# Patient Record
Sex: FEMALE | Race: WHITE
[De-identification: names, ages, dates, MRNs, and addresses within clinical notes are randomized per-mention and may not be internally consistent; named-entity substitution may affect disease eponyms.]

---

## 2017-01-12 ENCOUNTER — HOSPITAL ENCOUNTER (EMERGENCY)
Dept: HOSPITAL 10 - E/R | Age: 57
Discharge: HOME | End: 2017-01-12
Payer: COMMERCIAL

## 2017-01-12 VITALS — HEART RATE: 74 BPM | SYSTOLIC BLOOD PRESSURE: 116 MMHG | DIASTOLIC BLOOD PRESSURE: 61 MMHG | RESPIRATION RATE: 18 BRPM

## 2017-01-12 VITALS
WEIGHT: 165.35 LBS | BODY MASS INDEX: 25.95 KG/M2 | HEIGHT: 67 IN | BODY MASS INDEX: 25.95 KG/M2 | WEIGHT: 165.35 LBS | HEIGHT: 67 IN

## 2017-01-12 DIAGNOSIS — R10.31: Primary | ICD-10-CM

## 2017-01-12 LAB
ALBUMIN SERPL-MCNC: 4.1 G/DL (ref 3.3–4.9)
ALBUMIN/GLOB SERPL: 1.36 {RATIO}
ALP SERPL-CCNC: 106 IU/L (ref 42–121)
ALT SERPL-CCNC: 29 IU/L (ref 13–69)
ANION GAP SERPL CALC-SCNC: 18 MMOL/L (ref 8–16)
AST SERPL-CCNC: 26 IU/L (ref 15–46)
BASOPHILS # BLD AUTO: 0 10^3/UL (ref 0–0.1)
BASOPHILS NFR BLD: 0.7 % (ref 0–2)
BILIRUB DIRECT SERPL-MCNC: 0 MG/DL (ref 0–0.2)
BILIRUB SERPL-MCNC: 0.2 MG/DL (ref 0.2–1.3)
BUN SERPL-MCNC: 21 MG/DL (ref 7–20)
CALCIUM SERPL-MCNC: 9.3 MG/DL (ref 8.4–10.2)
CHLORIDE SERPL-SCNC: 111 MMOL/L (ref 97–110)
CO2 SERPL-SCNC: 23 MMOL/L (ref 21–31)
CONDITION: 1
CREAT SERPL-MCNC: 1.09 MG/DL (ref 0.44–1)
EOSINOPHIL # BLD: 0.2 10^3/UL (ref 0–0.5)
EOSINOPHIL NFR BLD: 3 % (ref 0–7)
ERYTHROCYTE [DISTWIDTH] IN BLOOD BY AUTOMATED COUNT: 12.8 % (ref 11.5–14.5)
GLOBULIN SER-MCNC: 3 G/DL (ref 1.3–3.2)
GLUCOSE SERPL-MCNC: 111 MG/DL (ref 70–220)
HCT VFR BLD CALC: 42.4 % (ref 37–47)
HGB BLD-MCNC: 14.7 G/DL (ref 12–16)
LYMPHOCYTES # BLD AUTO: 1.9 10^3/UL (ref 0.8–2.9)
LYMPHOCYTES NFR BLD AUTO: 37.4 % (ref 15–51)
MCH RBC QN AUTO: 31.3 PG (ref 29–33)
MCHC RBC AUTO-ENTMCNC: 34.8 G/DL (ref 32–37)
MCV RBC AUTO: 90.1 FL (ref 82–101)
MONOCYTES # BLD: 0.4 10^3/UL (ref 0.3–0.9)
MONOCYTES NFR BLD: 7.3 % (ref 0–11)
NEUTROPHILS # BLD: 2.6 10^3/UL (ref 1.6–7.5)
NEUTROPHILS NFR BLD AUTO: 51.6 % (ref 39–77)
NRBC # BLD MANUAL: 0 10^3/UL (ref 0–0)
NRBC BLD QL: 0 /100WBC (ref 0–0)
PLATELET # BLD: 174 10^3/UL (ref 140–440)
PMV BLD AUTO: 10.6 FL (ref 7.4–10.4)
POTASSIUM SERPL-SCNC: 4.2 MMOL/L (ref 3.5–5.1)
PROT SERPL-MCNC: 7.1 G/DL (ref 6.1–8.1)
RBC # BLD AUTO: 4.71 10^6/UL (ref 4.2–5.4)
SODIUM SERPL-SCNC: 148 MMOL/L (ref 135–144)
URINE BLOOD (DIP) POC: (no result)
WBC # BLD AUTO: 5.1 10^3/UL (ref 4.8–10.8)
WBC # BLD: 5.1 10^3/UL (ref 4.8–10.8)

## 2017-01-12 PROCEDURE — 83690 ASSAY OF LIPASE: CPT

## 2017-01-12 PROCEDURE — 96374 THER/PROPH/DIAG INJ IV PUSH: CPT

## 2017-01-12 PROCEDURE — 85025 COMPLETE CBC W/AUTO DIFF WBC: CPT

## 2017-01-12 PROCEDURE — 36415 COLL VENOUS BLD VENIPUNCTURE: CPT

## 2017-01-12 PROCEDURE — 96375 TX/PRO/DX INJ NEW DRUG ADDON: CPT

## 2017-01-12 PROCEDURE — 80053 COMPREHEN METABOLIC PANEL: CPT

## 2017-01-12 PROCEDURE — 81003 URINALYSIS AUTO W/O SCOPE: CPT

## 2017-01-12 PROCEDURE — 74176 CT ABD & PELVIS W/O CONTRAST: CPT

## 2017-01-12 NOTE — RADRPT
PROCEDURE:    CT ABDOMEN/PELVIS WITHOUT CONTRAST  

 

CLINICAL INDICATION:   56-year-old female with abdominal pain. 

 

TECHNIQUE:   The study was performed utilizing a GE LightSpeed VCT 64-slice CT scanner.  Direct axia
l sections were obtained through the abdomen and pelvis without the use of intravenous contrast mate
rial. Sagittal and coronal reformations were obtained. The images were reviewed on a PACS workstatio
n.   CTD/vol = 12.9 mGy; Total Exam DLP = 730 mGy-cm. 

 

COMPARISON:    CT abdomen/pelvis September 30, 2015. 

 

FINDINGS:

 

There is mild scarring within the lingula.  There is no evidence for significant pleural effusion.  
The liver has a normal size and contour without focal areas of abnormal density. No intrahepatic nor
 extrahepatic biliary ductal dilatation is seen. The gallbladder demonstrates no wall thickening nor
 pericholecystic fluid. No biliary stones are evident. The pancreas is without areas of abnormal att
enuation.  This spleen is identified and has a normal size without abnormal density. The adrenal gla
nds are unremarkable. There is mild right-sided hydroureteronephrosis with an obstructing calculus i
n the distal right ureter measuring approximately 2 x 2 mm.  There are multiple nonobstructing calcu
li scattered throughout the left kidney with the largest in the left lower pole measuring approximat
ely 5 x 6 x 5 mm. The urinary bladder contains a small volume of urine. There is moderate retained s
tool identified within the proximal colon without obstruction.  Multiple diverticula seen within 

the descending and sigmoid colon without surrounding inflammatory changes. The appendix is visualize
d and is without abnormal thickening or surrounding inflammatory reaction. The uterus is not visuali
zed consistent with prior hysterectomy.  There is no significant free fluid. The aortoiliac vessels 
are without aneurysmal dilatation. The osseous structures are intact. 

 

IMPRESSION:

 

1.  Mild right-sided hydroureteronephrosis with obstructing distal right ureteral 2 x 2 mm calculus.
   

2.  Multiple nonobstructing left renal calculi.

3.  Retained stool within the ascending and transverse colon without obstruction.

4.  Descending and sigmoid colon diverticulosis.

5.  No CT evidence for appendicitis.

6.  Status post hysterectomy.

_____________________________________________ 

.Masood Duarte MD, MD           Date    Time 

Electronically viewed and signed by .Masood Duarte MD, MD on 01/12/2017 05:58 

 

D:  01/12/2017 05:58  T:  01/12/2017 05:58

.SHI

## 2017-01-12 NOTE — ERA
ER Documentation


Chief Complaint


Date/Time


DATE: 17 


TIME: 04:50


Chief Complaint


RLQ abd pain radaiting to back since 1 hour ago





HPI


The patient is a 56-year-old female, presenting to the ER because of sudden 

onset of right sided abdominal pain about 3 AM.  She has similar symptoms 

previously from kidney stone.  The pain is 10/10, no aggravating or relieving 

factor.  She does not have fever, chills, neck pain, chest pain, vomiting, 

diarrhea, constipation, dysuria. She does not smoke nor drink





Past medical history: Migraine, anxiety, history of kidney stone


Past surgical history: 4 , hysterectomy





ROS


All systems reviewed and are negative except as per history of present illness.





Medications


Home Meds


Active Scripts


Hydrocodone/Acetaminophen (Norco 5-325 Tablet) 1 Each Tablet, 1 TAB PO Q6H Y 

for PAIN, #7 TAB


   Prov:MK PEDRO MD         17


Hydrocodone Bit-Acetaminophen* (Norco*) 5-325 Mg Tab, 1 TAB PO Q6 Y for PAIN, #

7 TAB


   Prov:MK PEDRO MD         9/30/15


Reported Medications


Zolpidem Tartrate* (Ambien*) 10 Mg Tablet, 10 MG PO HS Y for INSOMNIA, TAB


   9/30/15


Topiramate* (Topamax*) 100 Mg Tablet, 100 MG PO DAILY, TAB


   9/30/15


Sumatriptan Succinate* (Imitrex*) 25 Mg Tablet, 25 MG PO BID Y for MILD PAIN 

LEVEL 1-3, TAB


   May repeat after 2 hours if needed;  mg/24 hours


   14


Lorazepam* (Ativan*) 2 Mg Tablet, 2 MG PO HS


   10/2/13


Desloratadine (Clarinex) 5 Mg/Tab Tab.rapdis, 5 MG PO DAILY


   10/2/13





Allergies


Allergies:  


Coded Allergies:  


     latex (Verified  Allergy, Unknown, 9/30/15)


     levofloxacin (Verified  Allergy, Unknown, 9/30/15)





PMhx/Soc


History of Surgery:  Yes (2 CARPAL TUNNEL;RHINOPLASTY, 3 c-sections, 

hysterectomy, oofrectomy)


Anesthesia Reaction:  No


Hx Neurological Disorder:  Yes (MIGRAINES)


Hx Respiratory Disorders:  No


Hx Cardiac Disorders:  No


Hx Psychiatric Problems:  Yes (ANXIETY)


Hx Miscellaneous Medical Probl:  Yes (pain contract for work related injury)


Hx Alcohol Use:  No


Hx Substance Use:  No


Hx Tobacco Use:  No





Physical Exam


Vitals





Vital Signs








  Date Time  Temp Pulse Resp B/P Pulse Ox O2 Delivery O2 Flow Rate FiO2


 


17 05:43  74 18 116/61 99 Room Air  


 


17 04:32 98.3 76 20 172/81 98   








Physical Exam


 Const:      No acute distress.


 Head:        Atraumatic.


 Eyes:       Normal Conjunctiva.


 ENT:         Normal External Ears, Nose and Mouth.


 Neck:        Full range of motion.  No meningismus.


 Resp:         Clear to auscultation bilaterally.


 Cardio:       Regular rate and rhythm, no murmurs.


 Abd:         Soft,  non distended, normal bowel sounds, mild right flank 

tenderness and right lower quadrant tenderness.  No rigidity, rebound, CVA 

tenderness


 Skin:         No petechiae or rashes.


 Back:        No midline or flank tenderness.


 Ext:          No cyanosis, or edema.


 Neur:        Awake and alert. No focal deficit


 Psych:        Normal Mood and Affect.


Result Diagram:  


17 0505                                                                   

             17 0505





Results 24 hrs








 Laboratory Tests








Test


  17


05:05 17


05:50


 


Alanine Aminotransferase


(ALT/SGPT) 29IU/L 


  


 


 


Albumin 4.1g/dl  


 


Albumin/Globulin Ratio 1.36  


 


Alkaline Phosphatase 106IU/L  


 


Anion Gap 18  


 


Aspartate Amino Transf


(AST/SGOT) 26IU/L 


  


 


 


Basophils # 0.010^3/ul  


 


Basophils % 0.7%  


 


Blood Morphology Comment   


 


Blood Urea Nitrogen 21mg/dl  


 


Calcium Level 9.3mg/dl  


 


Carbon Dioxide Level 23mmol/L  


 


Chloride Level 111mmol/L  


 


Creatinine 1.09mg/dl  


 


Direct Bilirubin 0.00mg/dl  


 


Eosinophils # 0.210^3/ul  


 


Eosinophils % 3.0%  


 


Globulin 3.00g/dl  


 


Glucose Level 111mg/dl  


 


Hematocrit 42.4%  


 


Hemoglobin 14.7g/dl  


 


Indirect Bilirubin 0.2mg/dl  


 


Lipase 68U/L  


 


Lymphocytes # 1.910^3/ul  


 


Lymphocytes % 37.4%  


 


Mean Corpuscular Hemoglobin 31.3pg  


 


Mean Corpuscular Hemoglobin


Concent 34.8g/dl 


  


 


 


Mean Corpuscular Volume 90.1fl  


 


Mean Platelet Volume 10.6fl  


 


Monocytes # 0.410^3/ul  


 


Monocytes % 7.3%  


 


Neutrophils # 2.610^3/ul  


 


Neutrophils % 51.6%  


 


Nucleated Red Blood Cells # 0.010^3/ul  


 


Nucleated Red Blood Cells % 0.0/100WBC  


 


Platelet Count 34247^3/UL  


 


Potassium Level 4.2mmol/L  


 


Red Blood Count 4.7110^6/ul  


 


Red Cell Distribution Width 12.8%  


 


Sodium Level 148mmol/L  


 


Total Bilirubin 0.2mg/dl  


 


Total Protein 7.1g/dl  


 


White Blood Count 5.110^3/ul  


 


Bedside Urine Blood  3+ 


 


Bedside Urine Glucose (UA)  Negative 


 


Bedside Urine Ketones (LAB)  Negative 


 


Bedside Urine Leukocyte


Esterase (L 


  Trace 


 


 


Bedside Urine Nitrite (LAB)  Negative 


 


Bedside Urine Protein (LAB)  Negative 


 


Bedside Urine pH (LAB)  5.5 








 Current Medications








 Medications


  (Trade)  Dose


 Ordered  Sig/Bri


 Route


 PRN Reason  Start Time


 Stop Time Status Last Admin


Dose Admin


 


 Morphine Sulfate


  (morphine)  4 mg  ONCE  STAT


 IV


   17 04:58


 17 05:00 DC 17 05:10


 


 


 Ondansetron HCl


  (Zofran Inj)  4 mg  ONCE  STAT


 IV


   17 04:58


 17 05:00 DC 17 05:10


 


 


 Ketorolac


 Tromethamine


  (Toradol)  30 mg  ONCE  ONCE


 IV


   17 06:07


 17 06:08 DC 17 06:19


 














Procedures/[place] [id number]


 Radiology Main Line: [phone]





 DIAGNOSTIC IMAGING REPORT





 Patient: DUANE ROSA   : 1960   Age: 56  Sex: F                  

      


 MR #:    [id number]   [facility]   [id number]    DOS: 17 0458


 Ordering MD: MK PEDRO MD   Location:  E/R   Room/Bed:                  

                          


 








PROCEDURE:    CT ABDOMEN/PELVIS WITHOUT CONTRAST  


 


CLINICAL INDICATION:   56-year-old female with abdominal pain. 


 


TECHNIQUE:   The study was performed utilizing a GE LightSpeInVivioLink VCT 64-slice CT 

scanner.  Direct axial sections were obtained through the abdomen and pelvis 

without the use of intravenous contrast material. Sagittal and coronal 

reformations were obtained. The images were reviewed on a PACS workstation.   

CTD/vol = 12.9 mGy; Total Exam DLP = 730 mGy-cm. 


 


COMPARISON:    CT abdomen/pelvis 2015. 


 


FINDINGS:


 


There is mild scarring within the lingula.  There is no evidence for 

significant pleural effusion.  The liver has a normal size and contour without 

focal areas of abnormal density. No intrahepatic nor extrahepatic biliary 

ductal dilatation is seen. The gallbladder demonstrates no wall thickening nor 

pericholecystic fluid. No biliary stones are evident. The pancreas is without 

areas of abnormal attenuation.  This spleen is identified and has a normal size 

without abnormal density. The adrenal glands are unremarkable. There is mild 

right-sided hydroureteronephrosis with an obstructing calculus in the distal 

right ureter measuring approximately 2 x 2 mm.  There are multiple 

nonobstructing calculi scattered throughout the left kidney with the largest in 

the left lower pole measuring approximately 5 x 6 x 5 mm. The urinary bladder 

contains a small volume of urine. There is moderate retained stool identified 

within the proximal colon without obstruction.  Multiple diverticula seen 

within 


the descending and sigmoid colon without surrounding inflammatory changes. The 

appendix is visualized and is without abnormal thickening or surrounding 

inflammatory reaction. The uterus is not visualized consistent with prior 

hysterectomy.  There is no significant free fluid. The aortoiliac vessels are 

without aneurysmal dilatation. The osseous structures are intact. 


 


IMPRESSION:


 


1.  Mild right-sided hydroureteronephrosis with obstructing distal right 

ureteral 2 x 2 mm calculus.   


2.  Multiple nonobstructing left renal calculi.


3.  Retained stool within the ascending and transverse colon without 

obstruction.


4.  Descending and sigmoid colon diverticulosis.


5.  No CT evidence for appendicitis.


6.  Status post hysterectomy.


_____________________________________________ 


.Masood Duarte MD, MD           Date    Time 


Electronically viewed and signed by .Masood Duarte MD, MD on 2017 05:58 


 


D:  2017 05:58  T:  2017 05:58


.M/





CC: MK PEDRO MD





MEDICAL MAKING DECISION: The patient is a 56-year-old female, presenting with 

acute right ureterolithiasis.  She was treated with morphine 4 mg IV and 

Toradol 30 mg IV for pain and Zofran 4 mg IV for nausea with good response.  

The differential diagnoses considered include but are not limited to 

cholelithiasis, cholecystitis, cystitis, pancreatitis, hepatitis, gastritis, 

peptic ulcer disease, gastric ulcer, appendicitis, diverticulitis, cholangitis, 

choledocholithiasis, partial small bowel obstruction.





Departure


Diagnosis:  


 Primary Impression:  


 Ureterolithiasis


Condition:  Good


Comments


She was discharged with Norco


I discussed the findings with the patient. I advised the patient to follow-up 

with the primary physician in about 1-2 days, sooner if needed and return if 

any concern.





The patient's blood pressure was elevated (>120/80) but appears stable without 

evidence of hypertension emergency or urgency.  The patient was counseled about 

the risks of hypertension and urged to pursue outpatient monitoring and therapy 

within a week with their primary care physician.











MK PEDRO MD 2017 04:51

## 2017-01-17 ENCOUNTER — HOSPITAL ENCOUNTER (EMERGENCY)
Dept: HOSPITAL 10 - FTE | Age: 57
LOS: 1 days | Discharge: HOME | End: 2017-01-18
Payer: COMMERCIAL

## 2017-01-17 VITALS
BODY MASS INDEX: 26.4 KG/M2 | HEIGHT: 66 IN | WEIGHT: 164.24 LBS | WEIGHT: 164.24 LBS | BODY MASS INDEX: 26.4 KG/M2 | HEIGHT: 66 IN

## 2017-01-17 DIAGNOSIS — M25.561: Primary | ICD-10-CM

## 2017-01-17 DIAGNOSIS — Z91.040: ICD-10-CM

## 2017-01-17 PROCEDURE — 29505 APPLICATION LONG LEG SPLINT: CPT

## 2017-01-17 PROCEDURE — 73562 X-RAY EXAM OF KNEE 3: CPT

## 2017-01-17 NOTE — RADRPT
PROCEDURE:  knee x-ray 

 

CLINICAL INDICATION:  right knee pain

 

TECHNIQUE:   AP, lateral and oblique views of the right knee were obtained. 

 

COMPARISON:   None 

 

FINDINGS:

There is normal mineralization.  

No acute fracture or dislocation is seen.  

Minimal lateral compartment joint spurring.

There is no joint effusion. 

There is no significant soft tissue swelling. 

 

IMPRESSION:

No fracture or dislocation.  No soft tissue abnormality.

 

RPTAT:AAJJ

_____________________________________________ 

RUSTY Garcia Physician           Date    Time 

Electronically viewed and signed by Physician Rolf on 01/17/2017 23:55 

 

D:  01/17/2017 23:55  T:  01/17/2017 23:55

MIRELLA/

## 2017-01-17 NOTE — ERD
ER Documentation


Chief Complaint


Date/Time


DATE: 1/17/17 


TIME: 23:51


Chief Complaint


RT KNEE PAIN X 2 DAYS; LAST NORCO TAKEN YESTERDAY.





HPI


This is a 56-year-old female presenting to the emergency department complaining 

of right knee pain since Sunday.  Patient states that she bent down and heard a 

pop that started the pain.  She locates the pain in the anterior region of the 

right knee.  She rates the pain 7 out of 10.  Patient states that she has 

difficulty walking.





ROS


All systems reviewed and are negative except as per history of present illness.





Medications


Home Meds


Active Scripts


Naproxen* (Naproxen*) 500 Mg Tablet, 500 MG PO BID Y for PAIN, #30 TAB


   Prov:LEONARDA NYE PA-C         1/17/17


Hydrocodone/Acetaminophen (Norco 5-325 Tablet) 1 Each Tablet, 1 TAB PO Q6H Y 

for PAIN, #7 TAB


   Prov:MK PEDRO MD         1/12/17


Hydrocodone Bit-Acetaminophen* (Norco*) 5-325 Mg Tab, 1 TAB PO Q6 Y for PAIN, #

7 TAB


   Prov:MK PEDRO MD         9/30/15


Reported Medications


Zolpidem Tartrate* (Ambien*) 10 Mg Tablet, 10 MG PO HS Y for INSOMNIA, TAB


   9/30/15


Topiramate* (Topamax*) 100 Mg Tablet, 100 MG PO DAILY, TAB


   9/30/15


Sumatriptan Succinate* (Imitrex*) 25 Mg Tablet, 25 MG PO BID Y for MILD PAIN 

LEVEL 1-3, TAB


   May repeat after 2 hours if needed;  mg/24 hours


   6/27/14


Lorazepam* (Ativan*) 2 Mg Tablet, 2 MG PO HS


   10/2/13


Desloratadine (Clarinex) 5 Mg/Tab Tab.rapdis, 5 MG PO DAILY


   10/2/13





Allergies


Allergies:  


Coded Allergies:  


     latex (Verified  Allergy, Unknown, 9/30/15)


     levofloxacin (Verified  Allergy, Unknown, 9/30/15)





PMhx/Soc


History of Surgery:  Yes (2 CARPAL TUNNEL;RHINOPLASTY, 3 c-sections, 

hysterectomy, oofrectomy)


Anesthesia Reaction:  No


Hx Neurological Disorder:  Yes (MIGRAINES)


Hx Respiratory Disorders:  No


Hx Cardiac Disorders:  No


Hx Psychiatric Problems:  Yes (ANXIETY)


Hx Miscellaneous Medical Probl:  Yes (pain contract for work related injury, 

kidney stone)


Hx Alcohol Use:  No


Hx Substance Use:  No


Hx Tobacco Use:  No


Smoking Status:  Never smoker





Physical Exam


Vitals





Vital Signs








  Date Time  Temp Pulse Resp B/P Pulse Ox O2 Delivery O2 Flow Rate FiO2


 


1/17/17 20:12 98.4 86 18 157/80 99   








Physical Exam


General: WD/WN, in no apparent distress, non-toxic appearing


HENT: NC/AT


Eyes: Conjunctiva normal


Neck: Supple


Pulm: Clear to auscultation, normal labored breathing; no wheezing/rales/

rhonchi heard


CV: Good capillary refill


GI: Non-distended, no guarding


Back: No masses


Ext: Tender palpation over the right anterior knee, full range of motion, 

patient was ambulating well around the ER however in the exam she has had 

difficulty walking.


Neuro: Moves on all fours


Skin: intact


Psych: Normal mood





Procedures/MDM


This is a 56-year-old female presenting to the emergency department complaining 

of right knee pain since Sunday.  I will low suspicion for fracture 

dislocation.  I discussed with patient that she is suitable to follow-up with 

her primary care physician to get a referral to see an orthopedist to consider 

an MRI.  Patient examination room had tenderness in the right anterior knee and 

had difficulty walking in exam room however she was walking really well 

throughout the ER.  An x-ray of the right knee was done and did not show any 

evidence of fracture dislocation.  Patient will be given a prescription for 

naproxen.  With precautions to return to the ER for any worsening symptoms..  A 

knee immobilizer was applied, she is neurovascular intact she understands and 

agrees this plan





Departure


Diagnosis:  


 Primary Impression:  


 Knee pain


 Laterality:  right  Chronicity:  acute  Qualified Code:  M25.561 - Acute pain 

of right knee


Condition:  Stable


Patient Instructions:  Knee Pain, Uncertain Cause, Knee Sprain


Referrals:  


TUNDE ORTIZ MD (PCP)





Additional Instructions:  


FOLLOW UP WITH YOUR PRIMARY CARE PHYSICIAN TOMORROW.Return to this facility if 

you are not improving as expected.








Take all medicines as directed.








Return to this facility if you are not improving as expected.











LEONARDA NYE PA-C Jan 17, 2017 23:54

## 2017-01-18 VITALS
HEART RATE: 68 BPM | SYSTOLIC BLOOD PRESSURE: 142 MMHG | TEMPERATURE: 98.1 F | DIASTOLIC BLOOD PRESSURE: 74 MMHG | RESPIRATION RATE: 18 BRPM

## 2018-03-23 ENCOUNTER — HOSPITAL ENCOUNTER (EMERGENCY)
Age: 58
Discharge: HOME | End: 2018-03-23

## 2018-03-23 ENCOUNTER — HOSPITAL ENCOUNTER (EMERGENCY)
Dept: HOSPITAL 91 - E/R | Age: 58
Discharge: HOME | End: 2018-03-23
Payer: COMMERCIAL

## 2018-03-23 DIAGNOSIS — N20.0: Primary | ICD-10-CM

## 2018-03-23 LAB
ADD UMIC: YES
UR ASCORBIC ACID: NEGATIVE MG/DL
UR BILIRUBIN (DIP): NEGATIVE MG/DL
UR BLOOD (DIP): (no result) MG/DL
UR CLARITY: (no result)
UR COLOR: (no result)
UR GLUCOSE (DIP): NEGATIVE MG/DL
UR KETONES (DIP): NEGATIVE MG/DL
UR LEUKOCYTE ESTERASE (DIP): (no result) LEU/UL
UR NITRITE (DIP): NEGATIVE MG/DL
UR PH (DIP): 6 (ref 5–9)
UR RBC: > 182 /HPF (ref 0–5)
UR SPECIFIC GRAVITY (DIP): 1.02 (ref 1–1.03)
UR TOTAL PROTEIN (DIP): (no result) MG/DL
UR UROBILINOGEN (DIP): NEGATIVE MG/DL
UR WBC: 0 /HPF (ref 0–5)

## 2018-03-23 PROCEDURE — 81001 URINALYSIS AUTO W/SCOPE: CPT

## 2018-03-23 PROCEDURE — 99284 EMERGENCY DEPT VISIT MOD MDM: CPT

## 2018-03-23 PROCEDURE — 96372 THER/PROPH/DIAG INJ SC/IM: CPT

## 2018-03-23 RX ADMIN — KETOROLAC TROMETHAMINE 1 MG: 30 INJECTION, SOLUTION INTRAMUSCULAR at 16:30

## 2018-04-15 ENCOUNTER — HOSPITAL ENCOUNTER (INPATIENT)
Dept: HOSPITAL 91 - E/R | Age: 58
LOS: 1 days | Discharge: HOME | DRG: 670 | End: 2018-04-16
Payer: COMMERCIAL

## 2018-04-15 ENCOUNTER — HOSPITAL ENCOUNTER (INPATIENT)
Age: 58
LOS: 1 days | Discharge: HOME | DRG: 670 | End: 2018-04-16

## 2018-04-15 DIAGNOSIS — R11.2: ICD-10-CM

## 2018-04-15 DIAGNOSIS — N20.1: Primary | ICD-10-CM

## 2018-04-15 DIAGNOSIS — G43.909: ICD-10-CM

## 2018-04-15 LAB
ADD MAN DIFF?: NO
ADD UMIC: NO
ALANINE AMINOTRANSFERASE: 27 IU/L (ref 13–69)
ALBUMIN/GLOBULIN RATIO: 1.58
ALBUMIN: 3.8 G/DL (ref 3.3–4.9)
ALKALINE PHOSPHATASE: 89 IU/L (ref 42–121)
ANION GAP: 17 (ref 8–16)
ASPARTATE AMINO TRANSFERASE: 16 IU/L (ref 15–46)
BASOPHIL #: 0 10^3/UL (ref 0–0.1)
BASOPHILS %: 0.5 % (ref 0–2)
BILIRUBIN,DIRECT: 0 MG/DL (ref 0–0.2)
BILIRUBIN,TOTAL: 0.1 MG/DL (ref 0.2–1.3)
BLOOD UREA NITROGEN: 24 MG/DL (ref 7–20)
CALCIUM: 9.4 MG/DL (ref 8.4–10.2)
CARBON DIOXIDE: 24 MMOL/L (ref 21–31)
CHLORIDE: 109 MMOL/L (ref 97–110)
CREATININE: 1 MG/DL (ref 0.44–1)
EOSINOPHILS #: 0.1 10^3/UL (ref 0–0.5)
EOSINOPHILS %: 1.7 % (ref 0–7)
GLOBULIN: 2.4 G/DL (ref 1.3–3.2)
GLUCOSE: 116 MG/DL (ref 70–220)
HEMATOCRIT: 40 % (ref 37–47)
HEMOGLOBIN: 13.6 G/DL (ref 12–16)
INR: 0.87
LIPASE: 50 U/L (ref 23–300)
LYMPHOCYTES #: 1.2 10^3/UL (ref 0.8–2.9)
LYMPHOCYTES %: 15.5 % (ref 15–51)
MEAN CORPUSCULAR HEMOGLOBIN: 30.8 PG (ref 29–33)
MEAN CORPUSCULAR HGB CONC: 34 G/DL (ref 32–37)
MEAN CORPUSCULAR VOLUME: 90.5 FL (ref 82–101)
MEAN PLATELET VOLUME: 12.1 FL (ref 7.4–10.4)
MONOCYTE #: 0.6 10^3/UL (ref 0.3–0.9)
MONOCYTES %: 7.9 % (ref 0–11)
NEUTROPHIL #: 5.6 10^3/UL (ref 1.6–7.5)
NEUTROPHILS %: 74.1 % (ref 39–77)
NUCLEATED RED BLOOD CELLS #: 0 10^3/UL (ref 0–0)
NUCLEATED RED BLOOD CELLS%: 0 /100WBC (ref 0–0)
PARTIAL THROMBOPLASTIN TIME: 26.4 SEC (ref 25–35)
PLATELET COUNT: 185 10^3/UL (ref 140–415)
POSITIVE DIFF: (no result)
POTASSIUM: 3.8 MMOL/L (ref 3.5–5.1)
PROTIME: 11.9 SEC (ref 11.9–14.9)
PT RATIO: 0.9
RED BLOOD COUNT: 4.42 10^6/UL (ref 4.2–5.4)
RED CELL DISTRIBUTION WIDTH: 11.8 % (ref 11.5–14.5)
SODIUM: 146 MMOL/L (ref 135–144)
TOTAL PROTEIN: 6.2 G/DL (ref 6.1–8.1)
UR ASCORBIC ACID: NEGATIVE MG/DL
UR BILIRUBIN (DIP): NEGATIVE MG/DL
UR BLOOD (DIP): NEGATIVE MG/DL
UR CLARITY: CLEAR
UR COLOR: (no result)
UR GLUCOSE (DIP): NEGATIVE MG/DL
UR KETONES (DIP): NEGATIVE MG/DL
UR LEUKOCYTE ESTERASE (DIP): NEGATIVE LEU/UL
UR NITRITE (DIP): NEGATIVE MG/DL
UR PH (DIP): 7 (ref 5–9)
UR SPECIFIC GRAVITY (DIP): 1.01 (ref 1–1.03)
UR TOTAL PROTEIN (DIP): NEGATIVE MG/DL
UR UROBILINOGEN (DIP): NEGATIVE MG/DL
WHITE BLOOD COUNT: 7.6 10^3/UL (ref 4.8–10.8)

## 2018-04-15 PROCEDURE — 74018 RADEX ABDOMEN 1 VIEW: CPT

## 2018-04-15 PROCEDURE — 80053 COMPREHEN METABOLIC PANEL: CPT

## 2018-04-15 PROCEDURE — 74430 CONTRAST X-RAY BLADDER: CPT

## 2018-04-15 PROCEDURE — 99285 EMERGENCY DEPT VISIT HI MDM: CPT

## 2018-04-15 PROCEDURE — 85730 THROMBOPLASTIN TIME PARTIAL: CPT

## 2018-04-15 PROCEDURE — 81001 URINALYSIS AUTO W/SCOPE: CPT

## 2018-04-15 PROCEDURE — 93005 ELECTROCARDIOGRAM TRACING: CPT

## 2018-04-15 PROCEDURE — 86901 BLOOD TYPING SEROLOGIC RH(D): CPT

## 2018-04-15 PROCEDURE — 85025 COMPLETE CBC W/AUTO DIFF WBC: CPT

## 2018-04-15 PROCEDURE — 81003 URINALYSIS AUTO W/O SCOPE: CPT

## 2018-04-15 PROCEDURE — 96375 TX/PRO/DX INJ NEW DRUG ADDON: CPT

## 2018-04-15 PROCEDURE — 96374 THER/PROPH/DIAG INJ IV PUSH: CPT

## 2018-04-15 PROCEDURE — 71045 X-RAY EXAM CHEST 1 VIEW: CPT

## 2018-04-15 PROCEDURE — 86850 RBC ANTIBODY SCREEN: CPT

## 2018-04-15 PROCEDURE — 36415 COLL VENOUS BLD VENIPUNCTURE: CPT

## 2018-04-15 PROCEDURE — 86900 BLOOD TYPING SEROLOGIC ABO: CPT

## 2018-04-15 PROCEDURE — 83735 ASSAY OF MAGNESIUM: CPT

## 2018-04-15 PROCEDURE — 85610 PROTHROMBIN TIME: CPT

## 2018-04-15 PROCEDURE — 87086 URINE CULTURE/COLONY COUNT: CPT

## 2018-04-15 PROCEDURE — 84100 ASSAY OF PHOSPHORUS: CPT

## 2018-04-15 PROCEDURE — 83690 ASSAY OF LIPASE: CPT

## 2018-04-15 RX ADMIN — THIAMINE HYDROCHLORIDE 1 MLS/HR: 100 INJECTION, SOLUTION INTRAMUSCULAR; INTRAVENOUS at 09:03

## 2018-04-15 RX ADMIN — THIAMINE HYDROCHLORIDE 1 MLS/HR: 100 INJECTION, SOLUTION INTRAMUSCULAR; INTRAVENOUS at 15:29

## 2018-04-15 RX ADMIN — KETOROLAC TROMETHAMINE 1 MG: 15 INJECTION, SOLUTION INTRAMUSCULAR; INTRAVENOUS at 05:41

## 2018-04-15 RX ADMIN — MORPHINE SULFATE 1 MG: 2 INJECTION, SOLUTION INTRAMUSCULAR; INTRAVENOUS at 23:30

## 2018-04-15 RX ADMIN — MORPHINE SULFATE 1 MG: 2 INJECTION, SOLUTION INTRAMUSCULAR; INTRAVENOUS at 17:29

## 2018-04-15 RX ADMIN — THIAMINE HYDROCHLORIDE 1 MLS/HR: 100 INJECTION, SOLUTION INTRAMUSCULAR; INTRAVENOUS at 06:56

## 2018-04-15 RX ADMIN — THIAMINE HYDROCHLORIDE 1 MLS/HR: 100 INJECTION, SOLUTION INTRAMUSCULAR; INTRAVENOUS at 17:28

## 2018-04-15 RX ADMIN — ONDANSETRON HYDROCHLORIDE 1 MG: 2 INJECTION, SOLUTION INTRAMUSCULAR; INTRAVENOUS at 05:41

## 2018-04-15 RX ADMIN — MORPHINE SULFATE 1 MG: 2 INJECTION, SOLUTION INTRAMUSCULAR; INTRAVENOUS at 09:08

## 2018-04-15 RX ADMIN — SUMATRIPTAN SUCCINATE 1 MG: 25 TABLET ORAL at 11:44

## 2018-04-15 RX ADMIN — TOPIRAMATE 1 MG: 100 TABLET, FILM COATED ORAL at 11:44

## 2018-04-15 RX ADMIN — TAMSULOSIN HYDROCHLORIDE 1 MG: 0.4 CAPSULE ORAL at 20:16

## 2018-04-16 LAB
ADD MAN DIFF?: NO
ADD UMIC: YES
ALANINE AMINOTRANSFERASE: 20 IU/L (ref 13–69)
ALBUMIN/GLOBULIN RATIO: 1.29
ALBUMIN: 3.1 G/DL (ref 3.3–4.9)
ALKALINE PHOSPHATASE: 87 IU/L (ref 42–121)
ANION GAP: 14 (ref 8–16)
ASPARTATE AMINO TRANSFERASE: 17 IU/L (ref 15–46)
BASOPHIL #: 0.1 10^3/UL (ref 0–0.1)
BASOPHILS %: 0.9 % (ref 0–2)
BILIRUBIN,DIRECT: 0 MG/DL (ref 0–0.2)
BILIRUBIN,TOTAL: 0 MG/DL (ref 0.2–1.3)
BLOOD UREA NITROGEN: 22 MG/DL (ref 7–20)
CALCIUM: 8.8 MG/DL (ref 8.4–10.2)
CARBON DIOXIDE: 22 MMOL/L (ref 21–31)
CHLORIDE: 114 MMOL/L (ref 97–110)
CREATININE: 1.25 MG/DL (ref 0.44–1)
EOSINOPHILS #: 0.2 10^3/UL (ref 0–0.5)
EOSINOPHILS %: 3.9 % (ref 0–7)
GLOBULIN: 2.4 G/DL (ref 1.3–3.2)
GLUCOSE: 122 MG/DL (ref 70–220)
HEMATOCRIT: 36.6 % (ref 37–47)
HEMOGLOBIN: 12.4 G/DL (ref 12–16)
LYMPHOCYTES #: 1.6 10^3/UL (ref 0.8–2.9)
LYMPHOCYTES %: 29.6 % (ref 15–51)
MAGNESIUM: 1.8 MG/DL (ref 1.7–2.5)
MEAN CORPUSCULAR HEMOGLOBIN: 30.8 PG (ref 29–33)
MEAN CORPUSCULAR HGB CONC: 33.9 G/DL (ref 32–37)
MEAN CORPUSCULAR VOLUME: 91 FL (ref 82–101)
MEAN PLATELET VOLUME: 11.9 FL (ref 7.4–10.4)
MONOCYTE #: 0.5 10^3/UL (ref 0.3–0.9)
MONOCYTES %: 8.9 % (ref 0–11)
NEUTROPHIL #: 3 10^3/UL (ref 1.6–7.5)
NEUTROPHILS %: 56.5 % (ref 39–77)
NUCLEATED RED BLOOD CELLS #: 0 10^3/UL (ref 0–0)
NUCLEATED RED BLOOD CELLS%: 0 /100WBC (ref 0–0)
PHOSPHORUS: 3.7 MG/DL (ref 2.5–4.9)
PLATELET COUNT: 157 10^3/UL (ref 140–415)
POTASSIUM: 4.1 MMOL/L (ref 3.5–5.1)
RED BLOOD COUNT: 4.02 10^6/UL (ref 4.2–5.4)
RED CELL DISTRIBUTION WIDTH: 11.8 % (ref 11.5–14.5)
SODIUM: 146 MMOL/L (ref 135–144)
TOTAL PROTEIN: 5.5 G/DL (ref 6.1–8.1)
UR ASCORBIC ACID: NEGATIVE MG/DL
UR BILIRUBIN (DIP): NEGATIVE MG/DL
UR BLOOD (DIP): (no result) MG/DL
UR CLARITY: CLEAR
UR COLOR: (no result)
UR GLUCOSE (DIP): NEGATIVE MG/DL
UR KETONES (DIP): NEGATIVE MG/DL
UR LEUKOCYTE ESTERASE (DIP): NEGATIVE LEU/UL
UR NITRITE (DIP): NEGATIVE MG/DL
UR PH (DIP): 6 (ref 5–9)
UR RBC: 1 /HPF (ref 0–5)
UR SPECIFIC GRAVITY (DIP): 1.01 (ref 1–1.03)
UR TOTAL PROTEIN (DIP): NEGATIVE MG/DL
UR UROBILINOGEN (DIP): NEGATIVE MG/DL
UR WBC: 1 /HPF (ref 0–5)
WHITE BLOOD COUNT: 5.4 10^3/UL (ref 4.8–10.8)

## 2018-04-16 PROCEDURE — 0TC78ZZ EXTIRPATION OF MATTER FROM LEFT URETER, VIA NATURAL OR ARTIFICIAL OPENING ENDOSCOPIC: ICD-10-PCS

## 2018-04-16 PROCEDURE — 0T778DZ DILATION OF LEFT URETER WITH INTRALUMINAL DEVICE, VIA NATURAL OR ARTIFICIAL OPENING ENDOSCOPIC: ICD-10-PCS

## 2018-04-16 RX ADMIN — LIDOCAINE HYDROCHLORIDE 1 ML: 20 JELLY TOPICAL at 19:45

## 2018-04-16 RX ADMIN — MORPHINE SULFATE 1 MG: 2 INJECTION, SOLUTION INTRAMUSCULAR; INTRAVENOUS at 07:54

## 2018-04-16 RX ADMIN — THIAMINE HYDROCHLORIDE 1 MLS/HR: 100 INJECTION, SOLUTION INTRAMUSCULAR; INTRAVENOUS at 01:44

## 2018-04-16 RX ADMIN — TOPIRAMATE 1 MG: 100 TABLET, FILM COATED ORAL at 08:51

## 2018-04-16 RX ADMIN — THIAMINE HYDROCHLORIDE 1 MLS/HR: 100 INJECTION, SOLUTION INTRAMUSCULAR; INTRAVENOUS at 09:56

## 2018-04-16 RX ADMIN — ONDANSETRON HYDROCHLORIDE 1 MG: 2 INJECTION, SOLUTION INTRAMUSCULAR; INTRAVENOUS at 09:56

## 2018-04-16 RX ADMIN — DIPHENHYDRAMINE HYDROCHLORIDE 1 MG: 50 INJECTION, SOLUTION INTRAMUSCULAR; INTRAVENOUS at 20:22

## 2018-04-16 RX ADMIN — TAMSULOSIN HYDROCHLORIDE 1 MG: 0.4 CAPSULE ORAL at 21:00

## 2018-04-16 RX ADMIN — SUMATRIPTAN SUCCINATE 1 MG: 25 TABLET ORAL at 08:51

## 2018-04-16 RX ADMIN — ONDANSETRON HYDROCHLORIDE 1 MG: 2 INJECTION, SOLUTION INTRAMUSCULAR; INTRAVENOUS at 20:36

## 2018-04-16 RX ADMIN — MEPERIDINE HYDROCHLORIDE 1 MG: 25 INJECTION, SOLUTION INTRAMUSCULAR; INTRAVENOUS; SUBCUTANEOUS at 20:37

## 2018-04-28 ENCOUNTER — HOSPITAL ENCOUNTER (EMERGENCY)
Age: 58
Discharge: HOME | End: 2018-04-28

## 2018-04-28 ENCOUNTER — HOSPITAL ENCOUNTER (EMERGENCY)
Dept: HOSPITAL 91 - FTE | Age: 58
Discharge: HOME | End: 2018-04-28
Payer: COMMERCIAL

## 2018-04-28 DIAGNOSIS — G89.18: ICD-10-CM

## 2018-04-28 DIAGNOSIS — R10.32: Primary | ICD-10-CM

## 2018-04-28 PROCEDURE — 74018 RADEX ABDOMEN 1 VIEW: CPT

## 2018-04-28 PROCEDURE — 96372 THER/PROPH/DIAG INJ SC/IM: CPT

## 2018-04-28 PROCEDURE — 87086 URINE CULTURE/COLONY COUNT: CPT

## 2018-04-28 PROCEDURE — 99284 EMERGENCY DEPT VISIT MOD MDM: CPT

## 2018-04-28 RX ADMIN — KETOROLAC TROMETHAMINE 1 MG: 15 INJECTION, SOLUTION INTRAMUSCULAR; INTRAVENOUS at 12:04

## 2018-04-28 RX ADMIN — ONDANSETRON 1 MG: 4 TABLET, ORALLY DISINTEGRATING ORAL at 12:17

## 2018-04-28 RX ADMIN — KETOROLAC TROMETHAMINE 1 MG: 15 INJECTION, SOLUTION INTRAMUSCULAR; INTRAVENOUS at 12:18

## 2018-09-26 ENCOUNTER — HOSPITAL ENCOUNTER (OUTPATIENT)
Age: 58
Discharge: HOME | End: 2018-09-26

## 2018-09-26 ENCOUNTER — HOSPITAL ENCOUNTER (OUTPATIENT)
Dept: HOSPITAL 91 - GIL | Age: 58
Discharge: HOME | End: 2018-09-26
Payer: COMMERCIAL

## 2018-09-26 DIAGNOSIS — K57.90: ICD-10-CM

## 2018-09-26 DIAGNOSIS — Z12.11: Primary | ICD-10-CM

## 2018-09-26 DIAGNOSIS — K64.8: ICD-10-CM

## 2018-09-26 PROCEDURE — 45378 DIAGNOSTIC COLONOSCOPY: CPT

## 2019-02-06 ENCOUNTER — HOSPITAL ENCOUNTER (EMERGENCY)
Dept: HOSPITAL 91 - E/R | Age: 59
Discharge: HOME | End: 2019-02-06
Payer: COMMERCIAL

## 2019-02-06 DIAGNOSIS — J45.909: ICD-10-CM

## 2019-02-06 DIAGNOSIS — N20.1: Primary | ICD-10-CM

## 2019-02-06 DIAGNOSIS — Z91.040: ICD-10-CM

## 2019-02-06 LAB
ADD MAN DIFF?: NO
ADD UMIC: YES
ALANINE AMINOTRANSFERASE: 23 IU/L (ref 13–69)
ALBUMIN/GLOBULIN RATIO: 1.51
ALBUMIN: 4.7 G/DL (ref 3.3–4.9)
ALKALINE PHOSPHATASE: 147 IU/L (ref 42–121)
ANION GAP: 10 (ref 5–13)
ASPARTATE AMINO TRANSFERASE: 31 IU/L (ref 15–46)
BASOPHIL #: 0.1 10^3/UL (ref 0–0.1)
BASOPHILS %: 0.8 % (ref 0–2)
BILIRUBIN,DIRECT: 0 MG/DL (ref 0–0.2)
BILIRUBIN,TOTAL: 0.1 MG/DL (ref 0.2–1.3)
BLOOD UREA NITROGEN: 15 MG/DL (ref 7–20)
CALCIUM: 10 MG/DL (ref 8.4–10.2)
CARBON DIOXIDE: 24 MMOL/L (ref 21–31)
CHLORIDE: 111 MMOL/L (ref 97–110)
CREATININE: 0.76 MG/DL (ref 0.44–1)
EOSINOPHILS #: 0.2 10^3/UL (ref 0–0.5)
EOSINOPHILS %: 2.6 % (ref 0–7)
GLOBULIN: 3.1 G/DL (ref 1.3–3.2)
GLUCOSE: 111 MG/DL (ref 70–220)
HEMATOCRIT: 46.2 % (ref 37–47)
HEMOGLOBIN: 15.9 G/DL (ref 12–16)
IMMATURE GRANS #M: 0.03 10^3/UL (ref 0–0.03)
IMMATURE GRANS % (M): 0.5 % (ref 0–0.43)
LIPASE: 83 U/L (ref 23–300)
LYMPHOCYTES #: 2.1 10^3/UL (ref 0.8–2.9)
LYMPHOCYTES %: 33.3 % (ref 15–51)
MEAN CORPUSCULAR HEMOGLOBIN: 31.1 PG (ref 29–33)
MEAN CORPUSCULAR HGB CONC: 34.4 G/DL (ref 32–37)
MEAN CORPUSCULAR VOLUME: 90.4 FL (ref 82–101)
MEAN PLATELET VOLUME: 12.2 FL (ref 7.4–10.4)
MONOCYTE #: 0.4 10^3/UL (ref 0.3–0.9)
MONOCYTES %: 5.8 % (ref 0–11)
NEUTROPHIL #: 3.5 10^3/UL (ref 1.6–7.5)
NEUTROPHILS %: 57 % (ref 39–77)
NUCLEATED RED BLOOD CELLS #: 0 10^3/UL (ref 0–0)
NUCLEATED RED BLOOD CELLS%: 0 /100WBC (ref 0–0)
PLATELET COUNT: 223 10^3/UL (ref 140–415)
POTASSIUM: 3.9 MMOL/L (ref 3.5–5.1)
RED BLOOD COUNT: 5.11 10^6/UL (ref 4.2–5.4)
RED CELL DISTRIBUTION WIDTH: 12.1 % (ref 11.5–14.5)
SODIUM: 145 MMOL/L (ref 135–144)
TOTAL PROTEIN: 7.8 G/DL (ref 6.1–8.1)
UR ASCORBIC ACID: NEGATIVE MG/DL
UR BILIRUBIN (DIP): NEGATIVE MG/DL
UR BLOOD (DIP): (no result) MG/DL
UR CLARITY: (no result)
UR COLOR: YELLOW
UR GLUCOSE (DIP): NEGATIVE MG/DL
UR KETONES (DIP): NEGATIVE MG/DL
UR LEUKOCYTE ESTERASE (DIP): NEGATIVE LEU/UL
UR NITRITE (DIP): NEGATIVE MG/DL
UR PH (DIP): 6 (ref 5–9)
UR RBC: > 182 /HPF (ref 0–5)
UR SPECIFIC GRAVITY (DIP): 1.02 (ref 1–1.03)
UR TOTAL PROTEIN (DIP): (no result) MG/DL
UR UROBILINOGEN (DIP): NEGATIVE MG/DL
UR WBC: 27 /HPF (ref 0–5)
WHITE BLOOD COUNT: 6.2 10^3/UL (ref 4.8–10.8)

## 2019-02-06 PROCEDURE — 80053 COMPREHEN METABOLIC PANEL: CPT

## 2019-02-06 PROCEDURE — 99285 EMERGENCY DEPT VISIT HI MDM: CPT

## 2019-02-06 PROCEDURE — 96375 TX/PRO/DX INJ NEW DRUG ADDON: CPT

## 2019-02-06 PROCEDURE — 74176 CT ABD & PELVIS W/O CONTRAST: CPT

## 2019-02-06 PROCEDURE — 36415 COLL VENOUS BLD VENIPUNCTURE: CPT

## 2019-02-06 PROCEDURE — 96374 THER/PROPH/DIAG INJ IV PUSH: CPT

## 2019-02-06 PROCEDURE — 81001 URINALYSIS AUTO W/SCOPE: CPT

## 2019-02-06 PROCEDURE — 83690 ASSAY OF LIPASE: CPT

## 2019-02-06 PROCEDURE — 85025 COMPLETE CBC W/AUTO DIFF WBC: CPT

## 2019-02-06 RX ADMIN — KETOROLAC TROMETHAMINE 1 MG: 15 INJECTION, SOLUTION INTRAMUSCULAR; INTRAVENOUS at 19:44

## 2019-02-06 RX ADMIN — ONDANSETRON HYDROCHLORIDE 1 MG: 2 INJECTION, SOLUTION INTRAMUSCULAR; INTRAVENOUS at 19:44

## 2019-04-26 ENCOUNTER — HOSPITAL ENCOUNTER (EMERGENCY)
Dept: HOSPITAL 10 - E/R | Age: 59
LOS: 1 days | Discharge: HOME | End: 2019-04-27
Payer: COMMERCIAL

## 2019-04-26 ENCOUNTER — HOSPITAL ENCOUNTER (EMERGENCY)
Dept: HOSPITAL 91 - E/R | Age: 59
LOS: 1 days | Discharge: HOME | End: 2019-04-27
Payer: COMMERCIAL

## 2019-04-26 VITALS
WEIGHT: 176.37 LBS | WEIGHT: 176.37 LBS | HEIGHT: 64 IN | HEIGHT: 64 IN | BODY MASS INDEX: 30.11 KG/M2 | BODY MASS INDEX: 30.11 KG/M2

## 2019-04-26 DIAGNOSIS — J45.901: Primary | ICD-10-CM

## 2019-04-26 DIAGNOSIS — Z91.040: ICD-10-CM

## 2019-04-26 LAB
ADD MAN DIFF?: NO
ANION GAP: 10 (ref 5–13)
BASOPHIL #: 0 10^3/UL (ref 0–0.1)
BASOPHILS %: 0.5 % (ref 0–2)
BLOOD UREA NITROGEN: 17 MG/DL (ref 7–20)
CALCIUM: 9.9 MG/DL (ref 8.4–10.2)
CARBON DIOXIDE: 21 MMOL/L (ref 21–31)
CHLORIDE: 113 MMOL/L (ref 97–110)
CREATININE: 0.79 MG/DL (ref 0.44–1)
EOSINOPHILS #: 0.3 10^3/UL (ref 0–0.5)
EOSINOPHILS %: 4.9 % (ref 0–7)
GLUCOSE: 123 MG/DL (ref 70–220)
HEMATOCRIT: 42.9 % (ref 37–47)
HEMOGLOBIN: 14.5 G/DL (ref 12–16)
IMMATURE GRANS #M: 0.01 10^3/UL (ref 0–0.03)
IMMATURE GRANS % (M): 0.2 % (ref 0–0.43)
INR: 0.83
LYMPHOCYTES #: 2.7 10^3/UL (ref 0.8–2.9)
LYMPHOCYTES %: 44.8 % (ref 15–51)
MEAN CORPUSCULAR HEMOGLOBIN: 30 PG (ref 29–33)
MEAN CORPUSCULAR HGB CONC: 33.8 G/DL (ref 32–37)
MEAN CORPUSCULAR VOLUME: 88.6 FL (ref 82–101)
MEAN PLATELET VOLUME: 11.9 FL (ref 7.4–10.4)
MONOCYTE #: 0.6 10^3/UL (ref 0.3–0.9)
MONOCYTES %: 9.6 % (ref 0–11)
NEUTROPHIL #: 2.4 10^3/UL (ref 1.6–7.5)
NEUTROPHILS %: 40 % (ref 39–77)
NUCLEATED RED BLOOD CELLS #: 0 10^3/UL (ref 0–0)
NUCLEATED RED BLOOD CELLS%: 0 /100WBC (ref 0–0)
PARTIAL THROMBOPLASTIN TIME: 25.6 SEC (ref 23–35)
PLATELET COUNT: 211 10^3/UL (ref 140–415)
POTASSIUM: 3.7 MMOL/L (ref 3.5–5.1)
PROTIME: 11.5 SEC (ref 11.9–14.9)
PT RATIO: 0.9
RED BLOOD COUNT: 4.84 10^6/UL (ref 4.2–5.4)
RED CELL DISTRIBUTION WIDTH: 12.4 % (ref 11.5–14.5)
SODIUM: 144 MMOL/L (ref 135–144)
WHITE BLOOD COUNT: 6.1 10^3/UL (ref 4.8–10.8)

## 2019-04-26 PROCEDURE — 93005 ELECTROCARDIOGRAM TRACING: CPT

## 2019-04-26 PROCEDURE — 84484 ASSAY OF TROPONIN QUANT: CPT

## 2019-04-26 PROCEDURE — 85025 COMPLETE CBC W/AUTO DIFF WBC: CPT

## 2019-04-26 PROCEDURE — 96374 THER/PROPH/DIAG INJ IV PUSH: CPT

## 2019-04-26 PROCEDURE — 85610 PROTHROMBIN TIME: CPT

## 2019-04-26 PROCEDURE — 94644 CONT INHLJ TX 1ST HOUR: CPT

## 2019-04-26 PROCEDURE — 71045 X-RAY EXAM CHEST 1 VIEW: CPT

## 2019-04-26 PROCEDURE — 36415 COLL VENOUS BLD VENIPUNCTURE: CPT

## 2019-04-26 PROCEDURE — 99285 EMERGENCY DEPT VISIT HI MDM: CPT

## 2019-04-26 PROCEDURE — 80048 BASIC METABOLIC PNL TOTAL CA: CPT

## 2019-04-26 PROCEDURE — 85730 THROMBOPLASTIN TIME PARTIAL: CPT

## 2019-04-26 PROCEDURE — 83605 ASSAY OF LACTIC ACID: CPT

## 2019-04-26 RX ADMIN — ALBUTEROL SULFATE 1 MG: 2.5 SOLUTION RESPIRATORY (INHALATION) at 23:33

## 2019-04-26 RX ADMIN — METHYLPREDNISOLONE SODIUM SUCCINATE 1 MG: 125 INJECTION, POWDER, FOR SOLUTION INTRAMUSCULAR; INTRAVENOUS at 23:34

## 2019-04-26 RX ADMIN — IPRATROPIUM BROMIDE 1 MG: 0.5 SOLUTION RESPIRATORY (INHALATION) at 23:33

## 2019-04-26 RX ADMIN — ACETAMINOPHEN 1 MG: 325 TABLET, FILM COATED ORAL at 23:34

## 2019-04-27 VITALS — SYSTOLIC BLOOD PRESSURE: 135 MMHG | RESPIRATION RATE: 21 BRPM | DIASTOLIC BLOOD PRESSURE: 70 MMHG | HEART RATE: 85 BPM

## 2019-04-27 LAB — TROPONIN-I: < 0.012 NG/ML (ref 0–0.12)

## 2019-04-27 NOTE — ERD
ER Documentation


Chief Complaint


Chief Complaint





Audible wheezing, SOB, abd retractions





HPI


This is a 58-year-old female with a past medical history of hyperlipidemia, 


asthma, migraines who is presenting with concerns of an exacerbation of her 


asthma.  The patient reports issues over the last 2 to 3 months.  She reports a 


chronic cough with wheezing that is occasionally exacerbated.  The patient does 


not endorse any alleviating or exacerbating factors.  The patient reports that 


this evening, the patient was getting ready for bed when she developed a 


coughing fit with shortness of breath and wheezing and she was unable to get it 


under control at home.  She attempted to use her albuterol, but it was 


ineffective.  The patient endorses chest tightness but no chest pain.





The patient denies fever or chills.  The patient has had no vision changes.  The


patient does not endorse neck or back pain. The patient denies lightheadedness 


or dizziness.  The patient denies nausea or vomiting. The patient denies 


abdominal pain. The patient denies changes to bowel movements or urination.  The


patient has had no focal deficits.  The patient has had no weakness or numbness 


or tingling to the face or extremities.





ROS


All systems reviewed and are negative except as per history of present illness.





Medications


Home Meds


Active Scripts


Ibuprofen* (Motrin*) 600 Mg Tab, 600 MG PO Q6H PRN for PAIN AND OR ELEVATED 


TEMP, #30 TAB


   Prov:MK PEDRO MD         2/6/19


Reported Medications


Topiramate* (Topiramate*) 100 Mg Tablet, 100 MG PO BID, TAB


   2/6/19


Montelukast Sodium* (Montelukast Sodium*) 10 Mg Tablet, 10 MG PO QHS, #30 TAB


   2/6/19


Albuterol Sulfate* (Albuterol Sulfate* Neb) 0.083%-3 Ml Neb, 2.5 MG NEB Q6H PRN 


for WHEEZING AND SOB, #30 VIAL


   2/6/19


Albuterol Sulfate* (Ventolin HFA*) 18 Gm Hfa.aer.ad, 2 PUFF INHALATION Q6H, #1 


INHALER


   2/6/19


Hydrocodone/Acetaminophen (Norco  Tablet) 1 Each Tablet, 1 EACH PO Q6H, 


TAB


   2/6/19


Zolpidem Tartrate* (Zolpidem Tartrate*) 5 Mg Tablet, 5 MG PO QHS PRN for 


INSOMNIA, #30 TAB


   2/6/19


Sumatriptan Succinate* (Sumatriptan Succinate* Inj) 6 Mg/0.5 Ml Cartridge, 6 MG 


SQ Q12H PRN for MIGRAINE, EA


   MAX 6 mg/dose, repeat in 1 hour if needed. MAX 12 mg/24 hours


    AS NEEDED


   2/6/19


Fluticasone Propionate* (Fluticasone Propionate* Nasal) 50 Mcg/Spray - 16 Gm 


[place].susp, 1 SPRAY NASAL BID, #1 BOTTLE


   TO EACH NOSTRIL


   2/6/19


Discontinued Reported Medications


Naproxen* (Naproxen*) 250 Mg Tablet, 250 MG PO BID, TAB


   2/6/19


Azithromycin* (Azithromycin*) 250 Mg Tablet, 250 MG PO DAILY, #5 TAB


   TAKE 2TAB-1ST DAY THEN TAKE 1TAB-DAILY FOR 5 DAYS,START DATE 2/5/19.


   2/6/19





Allergies


Allergies:  


Coded Allergies:  


     latex (Unverified  Allergy, Unknown, 4/27/19)


     levofloxacin (Unverified  Allergy, Unknown, 4/27/19)





PMhx/Soc


History of Surgery:  Yes (real stones, c section x4, hysterectomy, CTR x2)


Anesthesia Reaction:  No


Hx Neurological Disorder:  No


Hx Respiratory Disorders:  Yes (asthma)


Hx Cardiac Disorders:  Yes (HLD)


Hx Psychiatric Problems:  No


Hx Miscellaneous Medical Probl:  Yes (MIGRAIN)


Hx Alcohol Use:  No


Hx Substance Use:  No


Hx Tobacco Use:  No


Smoking Status:  Unknown if ever smoked





FmHx


Family History:  No diabetes





Physical Exam


Vitals





Vital Signs


  Date      Temp  Pulse  Resp  B/P (MAP)   Pulse Ox  O2          O2 Flow    FiO2


Time                                                 Delivery    Rate


   4/26/19           84    20                    99                           21


     23:33


   4/26/19           88    16      152/99        99


     23:01                          (116)


   4/26/19  99.8    121    40      207/99        99


     22:57                          (135)





Physical Exam


Const:   No acute distress


Head:   Atraumatic 


Eyes:    Normal Conjunctiva


ENT:    Normal External Ears, Nose and Mouth.


Neck:               Full range of motion. No meningismus.


Resp:   Mild increased work of breathing.  Bilateral inspiratory and expiratory 


wheezes.  No rales or rhonchi.


Cardio:   Regular rhythm, tachycardia. No murmurs


Abd:    Soft, non tender, non distended. Normal bowel sounds


Skin:   No petechiae or rashes


Back:   No midline or flank tenderness


Ext:    No cyanosis, or edema


Neur:   Awake and alert


Psych:    Anxious


Result Diagram:  


4/26/19 2310                                                                    


           4/26/19 2310





Results 24 hrs





Laboratory Tests


       Test
                                 4/26/19
23:10  4/26/19
23:29


       White Blood Count                      6.1 10^3/ul


       Red Blood Count                       4.84 10^6/ul


       Hemoglobin                               14.5 g/dl


       Hematocrit                                  42.9 %


       Mean Corpuscular Volume                    88.6 fl


       Mean Corpuscular Hemoglobin                30.0 pg


       Mean Corpuscular Hemoglobin
Concent     33.8 g/dl 
  



       Red Cell Distribution Width                 12.4 %


       Platelet Count                         211 10^3/UL


       Mean Platelet Volume                       11.9 fl


       Immature Granulocytes %                    0.200 %


       Neutrophils %                               40.0 %


       Lymphocytes %                               44.8 %


       Monocytes %                                  9.6 %


       Eosinophils %                                4.9 %


       Basophils %                                  0.5 %


       Nucleated Red Blood Cells %            0.0 /100WBC


       Immature Granulocytes #              0.010 10^3/ul


       Neutrophils #                          2.4 10^3/ul


       Lymphocytes #                          2.7 10^3/ul


       Monocytes #                            0.6 10^3/ul


       Eosinophils #                          0.3 10^3/ul


       Basophils #                            0.0 10^3/ul


       Nucleated Red Blood Cells #            0.0 10^3/ul


       Prothrombin Time                          11.5 Sec


       Prothrombin Time Ratio                         0.9


       INR International Normalized
Ratio           0.83 
  



       Activated Partial
Thromboplast Time      25.6 Sec 
  



       Sodium Level                            144 mmol/L


       Potassium Level                         3.7 mmol/L


       Chloride Level                          113 mmol/L


       Carbon Dioxide Level                     21 mmol/L


       Anion Gap                                       10


       Blood Urea Nitrogen                       17 mg/dl


       Creatinine                              0.79 mg/dl


       Est Glomerular Filtrat Rate
mL/min   > 60 mL/min 
   



       Glucose Level                            123 mg/dl


       Calcium Level                            9.9 mg/dl


       Troponin I                           < 0.012 ng/ml


       POC Venous Lactate                                     1.8 mmol/L





Current Medications


 Medications
   Dose
          Sig/Bri
       Start Time
   Status  Last


 (Trade)       Ordered        Route
 PRN     Stop Time              Admin
Dose


                              Reason                                Admin


 Ipratropium
   1.5 mg         ONCE  STAT
    4/26/19       DC           4/26/19


[place]
                      INH
           22:57
                       23:33



(Atrovent                                    4/26/19 23:07


0.02%



(Neb))


 Albuterol
     15 mg          ONCE  STAT
    4/26/19       DC           4/26/19


(Proventil                    INH
           22:57
                       23:33



0.5%
  (Neb))                                4/26/19 23:07


                125 mg         ONCE  STAT
    4/26/19       DC           4/26/19


Methylprednis                 IV
            22:57
                       23:34



olone
 Sodium                                4/26/19 23:07


Succinate



(Solu-Medrol)


                650 mg         ONCE  ONCE
    4/26/19       DC           4/26/19


Acetaminophen                 PO
            23:00
                       23:34




  (Tylenol                                  4/26/19 23:07


Tab)








Procedures/MDM


MDM





The patient's presentation warrants further investigation. Previous medical 


records, if available, were reviewed.





LABS





The patient's laboratory testing was obtained and reviewed. No emergent treatmen


t was required unless described below.





CBC:   No E/o systemic infection or severe anemia or thrombocytopenia


Chemistry:   No E/o severe acidosis or alkalosis or renal failure or diabetic 


ketoacidosis


PT/INR:   No E/o significant coagulopathy


Troponin:   No E/o acute ischemia


Lactic Acid:   No E/o severe sepsis





EKG





EKG read by me: 


Rate/Rhythm:    Regular rate and rhythm at a rate of 90 bpm


Intervals:    Normal


Axis:    Normal


Impression:    No evidence of acute ischemia or arrhythmia





IMAGING





Imaging and Radiology interpretation reviewed.





CXR


FINDINGS: Mild low lung volumes. Mild bibasilar atelectasis. Hemidiaphragms 


remain sharply defined. No evidence of focal consolidation, pneumothorax, or 


pleural effusion. Cardiomediastinal silhouette is within normal limits. 


Visualized osseous thorax is unremarkable. Overlying soft tissues are equally 


unremarkable.


IMPRESSION: No evidence of acute cardiopulmonary process, allowing for mild low 


lung volumes.


Electronically viewed and signed by Physician Sunny on 04/26/2019 23:58 





TREATMENT/DISPOSITION





The patient presents for an asthma exacerbation.  I do not see evidence of an 


infectious etiology.  The patient was treated with nebulized albuterol and 


ipratropium in addition to Solu-Medrol with improvement of her symptoms.  After 


treatment, the patient's heart rate and respiratory rate resolved.





The patient's chest xray does not reveal pneumonia or pneumothorax or pleural 


effusions or pulmonary edema. The patient does not have a widened mediastinum 


and does not have signs or symptoms concerning for thoracic aortic aneurysm or 


dissection. The patient does not have pneumomediastinum or signs concerning for 


esophageal tear or rupture. The patient has no clinical or radiographic signs of


pericardial effusion or tamponade.  The patient does not have pneumoperitoneum 


and I have decreased suspicion of viscus perforation as possible referred pain. 


The patient does not have a history of heart failure and I have low suspicion 


for this. The patient is not hypoxic. The patient have pleuritic pain. The 


patient is not on hormonal therapy. The patient has no history of clotting or 


bleeding disorders. The patient has no calf tenderness. The patient has had no 


hemoptysis.  She has a more likely diagnosis and an asthma exacerbation.  I have


decreased suspicion for PE. The patient's troponin and EKG are reassuring. I 


have low suspicion for acute coronary syndrome. 





DISCHARGE





Upon reevaluation of the patient, symptoms have improved. No emergent diagnoses 


were identified. At this time, I feel that the patient stable for discharge.  


The patient was instructed to follow-up with a primary care physician in 1-3 


days.  The patient will be given strict precautions with which to return to the 


emergency department.





Prescriptions: Prednisone, Tessalon Perles





The patient's blood pressure was elevated at greater than 120/80 while in the 


emergency department.  The patient was otherwise stable with no evidence of 


hypertensive urgency or emergency.  The patient does not require admission for 


blood pressure control. I have discussed with the patient the risks of 


hypertension. I have instructed the patient to return to the ER for any new or 


worsening symptoms including chest pain, shortness of breath, headache, blurred 


vision, confusion, nausea, vomiting or LOC. I have advised the patient to follow


up with the primary care physician for outpatient monitoring and treatment for 


hypertension in 1-3 days.





Disclaimer: Inadvertent spelling and grammatical errors are likely due to 


EHR/dictation software use and do not reflect on the overall quality of patient 


care. Note that the electronic time recorded on this note does not necessarily 


reflect the actual time of the patient encounter.





Departure


Diagnosis:  


   Primary Impression:  


   Asthma exacerbation


   Asthma severity:  moderate  Asthma persistence:  unspecified  [employer]


   flash:  J45.901 - Unspecified asthma with (acute) exacerbation


   Additional Impressions:  


   Shortness of breath


   Chronic cough


Condition:  Stable


Patient Instructions:  Asthma, Cough, Chronic, Uncertain Cause, (Adult)





Additional Instructions:  


Thank you for for coming to [facility] for your care today. 


Please ask your nurse or provider if you have questions about your care today 


and do not leave until all your questions have been answered.  Please use any 


medications given as directed and follow-up with your doctor (or the doctor you 


were referred to) in the next 1-3 days. If you do not have a primary care doctor


you may follow up at the [facility] or [facility] clinic (listed below). You


may also use motrin and tylenol as needed for fever and/or pain unless 


instructed otherwise by your provider or nurse. Indications for more urgent 


follow-up have been discussed, but you may return to the Emergency Department at


ANY time for any worrisome or worsening symptoms.





If you have abdominal pain, please know that no test or exam you received is 


perfect and you should follow up within 8 hours for continued pain.





If you had any imaging studies today, such as an X-Ray or CT Scan, these studies


will be reviewed later by a radiologist. You will be called if there are 


important findings that were not identified today, so make sure the contact 


information you provided at registration is correct.





If you received any narcotic pain control medicine today, such as Vicodin, Mor


phine or Dilaudid, your coordination and judgment may be affected for a number 


of hours. Please do not drive or operate heavy machinery, and you may want 


someone to assist you at home. If you were given a prescription for narcotic 


medication, be aware that it is very addictive- use sparingly and only if 


necessary.





PLEASE SEEK FURTHER EVALUATION AND MANAGEMENT AT YOUR DOCTORS OFFICE WITHIN THE 


NEXT 1-3 DAYS. IT IS YOUR RESPONSIBILITY TO MAKE AN APPOINTMENT FOR FOLOW-UP 


CARE.





IF YOU HAVE A PRIMARY DOCTOR, PLEASE CALL THEIR OFFICE TO SCHEDULE AN 


APPOINTMENT FOR FOLLOW UP.





IF YOU DO NOT HAVE A PRIMARY DOCTOR YOU CAN CALL OUR PHYSICIAN REFERRAL HOTLINE 


AT [phone] 





IF YOU CAN NOT AFFORD TO SEE A PHYSICIAN YOU CAN CHOSE FROM THE FOLLOWING 


[facility] CLINICS:





[facility] [phone] [address] [facility] [phone] [address] [facility]. [facility] [phone] [address] [facility] [phone] [address] [facility] [phone] [address] [facility]. [facility]. [phone] [address] JACOBSILVIO MD              Apr 27, 2019 00:02

## 2019-06-16 ENCOUNTER — HOSPITAL ENCOUNTER (INPATIENT)
Dept: HOSPITAL 91 - E/R | Age: 59
LOS: 4 days | Discharge: HOME | DRG: 660 | End: 2019-06-20
Payer: COMMERCIAL

## 2019-06-16 ENCOUNTER — HOSPITAL ENCOUNTER (INPATIENT)
Dept: HOSPITAL 10 - E/R | Age: 59
LOS: 4 days | Discharge: HOME | DRG: 660 | End: 2019-06-20
Attending: INTERNAL MEDICINE
Payer: COMMERCIAL

## 2019-06-16 VITALS
BODY MASS INDEX: 28.17 KG/M2 | WEIGHT: 175.27 LBS | HEIGHT: 66 IN | HEIGHT: 66 IN | BODY MASS INDEX: 28.17 KG/M2 | WEIGHT: 175.27 LBS

## 2019-06-16 DIAGNOSIS — N13.6: Primary | ICD-10-CM

## 2019-06-16 DIAGNOSIS — J45.909: ICD-10-CM

## 2019-06-16 DIAGNOSIS — E78.5: ICD-10-CM

## 2019-06-16 DIAGNOSIS — R51: ICD-10-CM

## 2019-06-16 DIAGNOSIS — N20.2: ICD-10-CM

## 2019-06-16 LAB
ADD MAN DIFF?: NO
ADD UMIC: YES
ALANINE AMINOTRANSFERASE: 28 IU/L (ref 13–69)
ALBUMIN/GLOBULIN RATIO: 1.32
ALBUMIN: 4.1 G/DL (ref 3.3–4.9)
ALKALINE PHOSPHATASE: 115 IU/L (ref 42–121)
ANION GAP: 7 (ref 5–13)
ASPARTATE AMINO TRANSFERASE: 23 IU/L (ref 15–46)
BASOPHIL #: 0.1 10^3/UL (ref 0–0.1)
BASOPHILS %: 0.9 % (ref 0–2)
BILIRUBIN,DIRECT: 0 MG/DL (ref 0–0.2)
BILIRUBIN,TOTAL: 0.3 MG/DL (ref 0.2–1.3)
BLOOD UREA NITROGEN: 21 MG/DL (ref 7–20)
CALCIUM: 9.5 MG/DL (ref 8.4–10.2)
CARBON DIOXIDE: 26 MMOL/L (ref 21–31)
CHLORIDE: 111 MMOL/L (ref 97–110)
CREATININE: 0.91 MG/DL (ref 0.44–1)
EOSINOPHILS #: 0.2 10^3/UL (ref 0–0.5)
EOSINOPHILS %: 3 % (ref 0–7)
GLOBULIN: 3.1 G/DL (ref 1.3–3.2)
GLUCOSE: 111 MG/DL (ref 70–220)
HEMATOCRIT: 43.6 % (ref 37–47)
HEMOGLOBIN: 14.6 G/DL (ref 12–16)
IMMATURE GRANS #M: 0.01 10^3/UL (ref 0–0.03)
IMMATURE GRANS % (M): 0.2 % (ref 0–0.43)
LIPASE: 60 U/L (ref 23–300)
LYMPHOCYTES #: 1.9 10^3/UL (ref 0.8–2.9)
LYMPHOCYTES %: 35.6 % (ref 15–51)
MEAN CORPUSCULAR HEMOGLOBIN: 30 PG (ref 29–33)
MEAN CORPUSCULAR HGB CONC: 33.5 G/DL (ref 32–37)
MEAN CORPUSCULAR VOLUME: 89.7 FL (ref 82–101)
MEAN PLATELET VOLUME: 11.7 FL (ref 7.4–10.4)
MONOCYTE #: 0.5 10^3/UL (ref 0.3–0.9)
MONOCYTES %: 9.3 % (ref 0–11)
NEUTROPHIL #: 2.8 10^3/UL (ref 1.6–7.5)
NEUTROPHILS %: 51 % (ref 39–77)
NUCLEATED RED BLOOD CELLS #: 0 10^3/UL (ref 0–0)
NUCLEATED RED BLOOD CELLS%: 0 /100WBC (ref 0–0)
PLATELET COUNT: 180 10^3/UL (ref 140–415)
POTASSIUM: 3.9 MMOL/L (ref 3.5–5.1)
RED BLOOD COUNT: 4.86 10^6/UL (ref 4.2–5.4)
RED CELL DISTRIBUTION WIDTH: 12.3 % (ref 11.5–14.5)
SODIUM: 144 MMOL/L (ref 135–144)
TOTAL PROTEIN: 7.2 G/DL (ref 6.1–8.1)
UR ASCORBIC ACID: NEGATIVE MG/DL
UR BILIRUBIN (DIP): NEGATIVE MG/DL
UR BLOOD (DIP): (no result) MG/DL
UR CALCIUM OXALATE CRYSTAL: (no result) /HPF
UR CLARITY: (no result)
UR COLOR: YELLOW
UR GLUCOSE (DIP): NEGATIVE MG/DL
UR KETONES (DIP): NEGATIVE MG/DL
UR LEUKOCYTE ESTERASE (DIP): (no result) LEU/UL
UR NITRITE (DIP): NEGATIVE MG/DL
UR PH (DIP): 7 (ref 5–9)
UR RBC: > 182 /HPF (ref 0–5)
UR SPECIFIC GRAVITY (DIP): 1.02 (ref 1–1.03)
UR TOTAL PROTEIN (DIP): NEGATIVE MG/DL
UR UROBILINOGEN (DIP): (no result) MG/DL
UR WBC: 17 /HPF (ref 0–5)
WHITE BLOOD COUNT: 5.4 10^3/UL (ref 4.8–10.8)

## 2019-06-16 PROCEDURE — 80053 COMPREHEN METABOLIC PANEL: CPT

## 2019-06-16 PROCEDURE — 74176 CT ABD & PELVIS W/O CONTRAST: CPT

## 2019-06-16 PROCEDURE — 80048 BASIC METABOLIC PNL TOTAL CA: CPT

## 2019-06-16 PROCEDURE — 96375 TX/PRO/DX INJ NEW DRUG ADDON: CPT

## 2019-06-16 PROCEDURE — 88300 SURGICAL PATH GROSS: CPT

## 2019-06-16 PROCEDURE — 36415 COLL VENOUS BLD VENIPUNCTURE: CPT

## 2019-06-16 PROCEDURE — 85025 COMPLETE CBC W/AUTO DIFF WBC: CPT

## 2019-06-16 PROCEDURE — 74018 RADEX ABDOMEN 1 VIEW: CPT

## 2019-06-16 PROCEDURE — 85610 PROTHROMBIN TIME: CPT

## 2019-06-16 PROCEDURE — 82306 VITAMIN D 25 HYDROXY: CPT

## 2019-06-16 PROCEDURE — 83690 ASSAY OF LIPASE: CPT

## 2019-06-16 PROCEDURE — 81001 URINALYSIS AUTO W/SCOPE: CPT

## 2019-06-16 PROCEDURE — 74430 CONTRAST X-RAY BLADDER: CPT

## 2019-06-16 PROCEDURE — 96374 THER/PROPH/DIAG INJ IV PUSH: CPT

## 2019-06-16 PROCEDURE — 87086 URINE CULTURE/COLONY COUNT: CPT

## 2019-06-16 PROCEDURE — 83735 ASSAY OF MAGNESIUM: CPT

## 2019-06-16 PROCEDURE — 83036 HEMOGLOBIN GLYCOSYLATED A1C: CPT

## 2019-06-16 PROCEDURE — 93005 ELECTROCARDIOGRAM TRACING: CPT

## 2019-06-16 PROCEDURE — 80061 LIPID PANEL: CPT

## 2019-06-16 PROCEDURE — 85730 THROMBOPLASTIN TIME PARTIAL: CPT

## 2019-06-16 PROCEDURE — 84443 ASSAY THYROID STIM HORMONE: CPT

## 2019-06-16 PROCEDURE — C2617 STENT, NON-COR, TEM W/O DEL: HCPCS

## 2019-06-16 PROCEDURE — 71045 X-RAY EXAM CHEST 1 VIEW: CPT

## 2019-06-16 PROCEDURE — 99285 EMERGENCY DEPT VISIT HI MDM: CPT

## 2019-06-16 RX ADMIN — THIAMINE HYDROCHLORIDE 1 MLS/HR: 100 INJECTION, SOLUTION INTRAMUSCULAR; INTRAVENOUS at 22:36

## 2019-06-16 RX ADMIN — ONDANSETRON HYDROCHLORIDE 1 MG: 2 INJECTION, SOLUTION INTRAMUSCULAR; INTRAVENOUS at 22:36

## 2019-06-17 VITALS — HEART RATE: 70 BPM | DIASTOLIC BLOOD PRESSURE: 56 MMHG | SYSTOLIC BLOOD PRESSURE: 100 MMHG | RESPIRATION RATE: 18 BRPM

## 2019-06-17 VITALS — RESPIRATION RATE: 18 BRPM | DIASTOLIC BLOOD PRESSURE: 60 MMHG | HEART RATE: 62 BPM | SYSTOLIC BLOOD PRESSURE: 117 MMHG

## 2019-06-17 VITALS — RESPIRATION RATE: 19 BRPM | SYSTOLIC BLOOD PRESSURE: 139 MMHG | HEART RATE: 75 BPM | DIASTOLIC BLOOD PRESSURE: 77 MMHG

## 2019-06-17 VITALS — DIASTOLIC BLOOD PRESSURE: 52 MMHG | RESPIRATION RATE: 19 BRPM | HEART RATE: 61 BPM | SYSTOLIC BLOOD PRESSURE: 101 MMHG

## 2019-06-17 LAB
ADD MAN DIFF?: NO
ALANINE AMINOTRANSFERASE: 21 IU/L (ref 13–69)
ALBUMIN/GLOBULIN RATIO: 1.5
ALBUMIN: 3.6 G/DL (ref 3.3–4.9)
ALKALINE PHOSPHATASE: 96 IU/L (ref 42–121)
ANION GAP: 6 (ref 5–13)
ASPARTATE AMINO TRANSFERASE: 21 IU/L (ref 15–46)
BASOPHIL #: 0.1 10^3/UL (ref 0–0.1)
BASOPHILS %: 1.1 % (ref 0–2)
BILIRUBIN,DIRECT: 0 MG/DL (ref 0–0.2)
BILIRUBIN,TOTAL: 0.3 MG/DL (ref 0.2–1.3)
BLOOD UREA NITROGEN: 19 MG/DL (ref 7–20)
CALCIUM: 9 MG/DL (ref 8.4–10.2)
CARBON DIOXIDE: 23 MMOL/L (ref 21–31)
CHLORIDE: 115 MMOL/L (ref 97–110)
CHOL/HDL RATIO: 5 RATIO
CHOLESTEROL: 232 MG/DL (ref 100–200)
CREATININE: 0.82 MG/DL (ref 0.44–1)
EOSINOPHILS #: 0.2 10^3/UL (ref 0–0.5)
EOSINOPHILS %: 5.2 % (ref 0–7)
GLOBULIN: 2.4 G/DL (ref 1.3–3.2)
GLUCOSE: 111 MG/DL (ref 70–220)
HDL CHOLESTEROL: 46 MG/DL (ref 37–92)
HEMATOCRIT: 41.6 % (ref 37–47)
HEMOGLOBIN A1C: 5.4 % (ref 0–5.9)
HEMOGLOBIN: 13.8 G/DL (ref 12–16)
IMMATURE GRANS #M: 0.01 10^3/UL (ref 0–0.03)
IMMATURE GRANS % (M): 0.2 % (ref 0–0.43)
INR: 0.88
LDL CHOLESTEROL,CALCULATED: 166 MG/DL
LYMPHOCYTES #: 1.9 10^3/UL (ref 0.8–2.9)
LYMPHOCYTES %: 42.7 % (ref 15–51)
MAGNESIUM: 2.3 MG/DL (ref 1.7–2.5)
MEAN CORPUSCULAR HEMOGLOBIN: 30.1 PG (ref 29–33)
MEAN CORPUSCULAR HGB CONC: 33.2 G/DL (ref 32–37)
MEAN CORPUSCULAR VOLUME: 90.6 FL (ref 82–101)
MEAN PLATELET VOLUME: 12.2 FL (ref 7.4–10.4)
MONOCYTE #: 0.5 10^3/UL (ref 0.3–0.9)
MONOCYTES %: 10.5 % (ref 0–11)
NEUTROPHIL #: 1.8 10^3/UL (ref 1.6–7.5)
NEUTROPHILS %: 40.3 % (ref 39–77)
NUCLEATED RED BLOOD CELLS #: 0 10^3/UL (ref 0–0)
NUCLEATED RED BLOOD CELLS%: 0 /100WBC (ref 0–0)
PARTIAL THROMBOPLASTIN TIME: 27.3 SEC (ref 23–35)
PLATELET COUNT: 177 10^3/UL (ref 140–415)
POTASSIUM: 3.9 MMOL/L (ref 3.5–5.1)
PROTIME: 12 SEC (ref 11.9–14.9)
PT RATIO: 0.9
RED BLOOD COUNT: 4.59 10^6/UL (ref 4.2–5.4)
RED CELL DISTRIBUTION WIDTH: 12.4 % (ref 11.5–14.5)
SODIUM: 144 MMOL/L (ref 135–144)
THYROID STIMULATING HORMONE: 2.71 MIU/L (ref 0.47–4.68)
TOTAL PROTEIN: 6 G/DL (ref 6.1–8.1)
TRIGLYCERIDES: 98 MG/DL (ref 0–149)
WHITE BLOOD COUNT: 4.4 10^3/UL (ref 4.8–10.8)

## 2019-06-17 RX ADMIN — HYDROMORPHONE HYDROCHLORIDE 1 MG: 1 INJECTION, SOLUTION INTRAMUSCULAR; INTRAVENOUS; SUBCUTANEOUS at 11:45

## 2019-06-17 RX ADMIN — KETOROLAC TROMETHAMINE SCH MG: 15 INJECTION, SOLUTION INTRAMUSCULAR; INTRAVENOUS at 09:13

## 2019-06-17 RX ADMIN — THIAMINE HYDROCHLORIDE 1 MLS/HR: 100 INJECTION, SOLUTION INTRAMUSCULAR; INTRAVENOUS at 13:17

## 2019-06-17 RX ADMIN — KETOROLAC TROMETHAMINE SCH MG: 15 INJECTION, SOLUTION INTRAMUSCULAR; INTRAVENOUS at 21:54

## 2019-06-17 RX ADMIN — KETOROLAC TROMETHAMINE 1 MG: 15 INJECTION, SOLUTION INTRAMUSCULAR; INTRAVENOUS at 21:54

## 2019-06-17 RX ADMIN — FOLIC ACID SCH MLS/HR: 5 INJECTION, SOLUTION INTRAMUSCULAR; INTRAVENOUS; SUBCUTANEOUS at 03:22

## 2019-06-17 RX ADMIN — ONDANSETRON HYDROCHLORIDE 1 MG: 2 INJECTION, SOLUTION INTRAMUSCULAR; INTRAVENOUS at 08:56

## 2019-06-17 RX ADMIN — THIAMINE HYDROCHLORIDE 1 MLS/HR: 100 INJECTION, SOLUTION INTRAMUSCULAR; INTRAVENOUS at 03:22

## 2019-06-17 RX ADMIN — ONDANSETRON HYDROCHLORIDE 1 MG: 2 INJECTION, SOLUTION INTRAMUSCULAR; INTRAVENOUS at 03:24

## 2019-06-17 RX ADMIN — FOLIC ACID SCH MLS/HR: 5 INJECTION, SOLUTION INTRAMUSCULAR; INTRAVENOUS; SUBCUTANEOUS at 22:28

## 2019-06-17 RX ADMIN — DEXAMETHASONE SODIUM PHOSPHATE PRN MG: 10 INJECTION, SOLUTION INTRAMUSCULAR; INTRAVENOUS at 08:56

## 2019-06-17 RX ADMIN — CEFTRIAXONE 1 MLS/HR: 1 INJECTION, SOLUTION INTRAVENOUS at 01:47

## 2019-06-17 RX ADMIN — SUMATRIPTAN SUCCINATE PRN MG: 6 INJECTION SUBCUTANEOUS at 13:17

## 2019-06-17 RX ADMIN — TAMSULOSIN HYDROCHLORIDE 1 MG: 0.4 CAPSULE ORAL at 03:24

## 2019-06-17 RX ADMIN — FOLIC ACID SCH MLS/HR: 5 INJECTION, SOLUTION INTRAMUSCULAR; INTRAVENOUS; SUBCUTANEOUS at 13:17

## 2019-06-17 RX ADMIN — KETOROLAC TROMETHAMINE SCH MG: 15 INJECTION, SOLUTION INTRAMUSCULAR; INTRAVENOUS at 15:44

## 2019-06-17 RX ADMIN — DEXAMETHASONE SODIUM PHOSPHATE PRN MG: 10 INJECTION, SOLUTION INTRAMUSCULAR; INTRAVENOUS at 03:24

## 2019-06-17 RX ADMIN — KETOROLAC TROMETHAMINE 1 MG: 15 INJECTION, SOLUTION INTRAMUSCULAR; INTRAVENOUS at 03:24

## 2019-06-17 RX ADMIN — THIAMINE HYDROCHLORIDE 1 MLS/HR: 100 INJECTION, SOLUTION INTRAMUSCULAR; INTRAVENOUS at 22:28

## 2019-06-17 RX ADMIN — KETOROLAC TROMETHAMINE 1 MG: 15 INJECTION, SOLUTION INTRAMUSCULAR; INTRAVENOUS at 09:13

## 2019-06-17 RX ADMIN — KETOROLAC TROMETHAMINE 1 MG: 15 INJECTION, SOLUTION INTRAMUSCULAR; INTRAVENOUS at 15:44

## 2019-06-17 RX ADMIN — KETOROLAC TROMETHAMINE SCH MG: 15 INJECTION, SOLUTION INTRAMUSCULAR; INTRAVENOUS at 03:24

## 2019-06-17 RX ADMIN — TAMSULOSIN HYDROCHLORIDE SCH MG: 0.4 CAPSULE ORAL at 21:53

## 2019-06-17 RX ADMIN — TAMSULOSIN HYDROCHLORIDE 1 MG: 0.4 CAPSULE ORAL at 21:53

## 2019-06-17 RX ADMIN — HYDROMORPHONE HYDROCHLORIDE 1 MG: 1 INJECTION, SOLUTION INTRAMUSCULAR; INTRAVENOUS; SUBCUTANEOUS at 16:35

## 2019-06-17 RX ADMIN — HYDROMORPHONE HYDROCHLORIDE PRN MG: 1 INJECTION, SOLUTION INTRAMUSCULAR; INTRAVENOUS; SUBCUTANEOUS at 16:35

## 2019-06-17 RX ADMIN — HYDROMORPHONE HYDROCHLORIDE PRN MG: 1 INJECTION, SOLUTION INTRAMUSCULAR; INTRAVENOUS; SUBCUTANEOUS at 11:45

## 2019-06-17 RX ADMIN — SUMATRIPTAN SUCCINATE 1 MG: 6 INJECTION SUBCUTANEOUS at 13:17

## 2019-06-17 NOTE — ERD
ER Documentation


Chief Complaint


Chief Complaint





RIGHT PELVIC PAIN WITH RIGHT FLANK PAIN X 3 DAYS, HX OF KIDNEY STONES





HPI


59-year-old female coming in the right lower extremity pain status post 


mechanical fall.  She is complains of pain in her hip and her knee.  Has a 


history of surgically repaired knee.  Denies fevers chills nausea vomiting.  


Denies head trauma.  Denies any other current issues





ROS


All systems reviewed and are negative except as per history of present illness.





Medications


Home Meds


Active Scripts


Benzonatate* (Tessalon Perle*) 100 Mg Capsule, 100 MG PO Q8H PRN for COUGH, #10 


CAP


   Prov:SILVIO JACOB MD         4/27/19


Prednisone* (Prednisone*) 20 Mg Tab, 40 MG PO DAILY for 4 Days, TAB


   Prov:SILVIO JACOB MD         4/27/19


Ibuprofen* (Motrin*) 600 Mg Tab, 600 MG PO Q6H PRN for PAIN AND OR ELEVATED 


TEMP, #30 TAB


   Prov:MK PEDRO MD         2/6/19


Reported Medications


Topiramate* (Topiramate*) 100 Mg Tablet, 100 MG PO BID, TAB


   2/6/19


Montelukast Sodium* (Montelukast Sodium*) 10 Mg Tablet, 10 MG PO QHS, #30 TAB


   2/6/19


Albuterol Sulfate* (Albuterol Sulfate* Neb) 0.083%-3 Ml Neb, 2.5 MG NEB Q6H PRN 


for WHEEZING AND SOB, #30 VIAL


   2/6/19


Albuterol Sulfate* (Ventolin HFA*) 18 Gm Hfa.aer.ad, 2 PUFF INHALATION Q6H, #1 


INHALER


   2/6/19


Hydrocodone/Acetaminophen (Norco  Tablet) 1 Each Tablet, 1 EACH PO Q6H, 


TAB


   2/6/19


Zolpidem Tartrate* (Zolpidem Tartrate*) 5 Mg Tablet, 5 MG PO QHS PRN for 


INSOMNIA, #30 TAB


   2/6/19


Sumatriptan Succinate* (Sumatriptan Succinate* Inj) 6 Mg/0.5 Ml Cartridge, 6 MG 


SQ Q12H PRN for MIGRAINE, EA


   MAX 6 mg/dose, repeat in 1 hour if needed. MAX 12 mg/24 hours


    AS NEEDED


   2/6/19


Fluticasone Propionate* (Fluticasone Propionate* Nasal) 50 Mcg/Spray - 16 Gm 


[place].susp, 1 SPRAY NASAL BID, #1 BOTTLE


   TO EACH NOSTRIL


   2/6/19





Allergies


Allergies:  


Coded Allergies:  


     latex (Unverified  Allergy, Unknown, 4/27/19)


     levofloxacin (Unverified  Allergy, Unknown, 4/27/19)





PMhx/Soc


History of Surgery:  Yes (real stones, c section x4, hysterectomy, CTR x2)


Anesthesia Reaction:  No


Hx Neurological Disorder:  No


Hx Respiratory Disorders:  Yes (asthma)


Hx Cardiac Disorders:  Yes (HLD)


Hx Psychiatric Problems:  No


Hx Miscellaneous Medical Probl:  Yes (MIGRAIN)


Hx Alcohol Use:  No


Hx Substance Use:  No


Hx Tobacco Use:  No


Smoking Status:  Never smoker





Physical Exam


Vitals





Vital Signs


  Date      Temp  Pulse  Resp  B/P (MAP)   Pulse Ox  O2          O2 Flow    FiO2


Time                                                 Delivery    Rate


   6/16/19  98.4     81    20      165/82        99  Room Air


     22:05                          (109)


   6/16/19  98.4     95    20      190/90        99


     21:25                          (123)





Physical Exam


Const:   No acute distress


Head:   Atraumatic 


Eyes:    Normal Conjunctiva


ENT:    Normal External Ears, Nose and Mouth.


Neck:               Full range of motion. No meningismus.


Resp:   Clear to auscultation bilaterally


Cardio:   Regular rate and rhythm, no murmurs


Abd:    Soft, non tender, non distended. Normal bowel sounds


Skin:   No petechiae or rashes


Back:   No midline or flank tenderness


Ext:    No cyanosis, or edema


Neur:   Awake and alert


Psych:    Normal Mood and Affect


Result Diagram:  


6/16/19 2227 6/16/19 2227





Results 24 hrs





Laboratory Tests


      Test
                                 6/16/19
22:27    6/16/19
22:37


      White Blood Count                      5.4 10^3/ul


      Red Blood Count                       4.86 10^6/ul


      Hemoglobin                               14.6 g/dl


      Hematocrit                                  43.6 %


      Mean Corpuscular Volume                    89.7 fl


      Mean Corpuscular Hemoglobin                30.0 pg


      Mean Corpuscular Hemoglobin
Concent     33.5 g/dl 
  



      Red Cell Distribution Width                 12.3 %


      Platelet Count                         180 10^3/UL


      Mean Platelet Volume                       11.7 fl


      Immature Granulocytes %                    0.200 %


      Neutrophils %                               51.0 %


      Lymphocytes %                               35.6 %


      Monocytes %                                  9.3 %


      Eosinophils %                                3.0 %


      Basophils %                                  0.9 %


      Nucleated Red Blood Cells %            0.0 /100WBC


      Immature Granulocytes #              0.010 10^3/ul


      Neutrophils #                          2.8 10^3/ul


      Lymphocytes #                          1.9 10^3/ul


      Monocytes #                            0.5 10^3/ul


      Eosinophils #                          0.2 10^3/ul


      Basophils #                            0.1 10^3/ul


      Nucleated Red Blood Cells #            0.0 10^3/ul


      Sodium Level                            144 mmol/L


      Potassium Level                         3.9 mmol/L


      Chloride Level                          111 mmol/L


      Carbon Dioxide Level                     26 mmol/L


      Anion Gap                                        7


      Blood Urea Nitrogen                       21 mg/dl


      Creatinine                              0.91 mg/dl


      Est Glomerular Filtrat Rate
mL/min   > 60 mL/min 
   



      Glucose Level                            111 mg/dl


      Calcium Level                            9.5 mg/dl


      Total Bilirubin                          0.3 mg/dl


      Direct Bilirubin                        0.00 mg/dl


      Indirect Bilirubin                       0.3 mg/dl


      Aspartate Amino Transf
(AST/SGOT)         23 IU/L 
  



      Alanine Aminotransferase
(ALT/SGPT)       28 IU/L 
  



      Alkaline Phosphatase                      115 IU/L


      Total Protein                             7.2 g/dl


      Albumin                                   4.1 g/dl


      Globulin                                 3.10 g/dl


      Albumin/Globulin Ratio                        1.32


      Lipase                                      60 U/L


      Urine Color                                          YELLOW


      Urine Clarity
                       
               SLIGHTLY
CLOUDY


      Urine pH                                                        7.0


      Urine Specific Gravity                                        1.017


      Urine Ketones                                        NEGATIVE mg/dL


      Urine Nitrite                                        NEGATIVE mg/dL


      Urine Bilirubin                                      NEGATIVE mg/dL


      Urine Urobilinogen                                         1+ mg/dL


      Urine Leukocyte Esterase                                  1+ Vicente/ul


      Urine Microscopic RBC                                > 182 /HPF


      Urine Microscopic WBC                                       17 /HPF


      Urine Calcium Oxalate Crystals                       FEW /HPF


      Urine Hemoglobin                                           3+ mg/dL


      Urine Glucose                                        NEGATIVE mg/dL


      Urine Total Protein                                  NEGATIVE mg/dl





Current Medications


 Medications
   Dose
          Sig/Bri
       Start Time
   Status  Last


 (Trade)       Ordered        Route
 PRN     Stop Time              Admin
Dose


                              Reason                                Admin


 Sodium         1,000 ml @ 
   Q1H STAT
      6/16/19       DC           6/16/19


Chloride       1,000 mls/hr   IV
            22:16
                       22:36



                                             6/16/19 23:15


 Morphine       4 mg           ONCE  STAT
    6/16/19       DC           6/16/19


Sulfate
                      IV
            22:16
                       22:36



(morphine)                                   6/16/19 22:17


 Ondansetron    4 mg           ONCE  STAT
    6/16/19       DC           6/16/19


HCl
  (Zofran                 IV
            22:16
                       22:36



Inj)                                         6/16/19 22:17


 Ceftriaxone    50 ml @ 
      ONCE  ONCE
    6/17/19       DC           6/17/19


Sodium         100 mls/hr     IVPB
          01:30
                       01:47



                                             6/17/19 01:59


 Ondansetron    4 mg           BRIDGE ORDER   6/17/19                



HCl
  (Zofran                 PRN
 IV
       02:00



Inj)                          NAUSEA/VOMITI  6/18/19 01:59


                              NG


                650 mg         ER BRIDGE      6/17/19                



Acetaminophen                 PRN
 PO
       02:00




  (Tylenol                   .MILD PAIN     6/18/19 01:59


Tab)                          1-3 OR TEMP








Procedures/MDM


X-ray Hip 2V Interpreted by me: 


Bones:        [No fracture]


Joints:      [No dislocation]


Foreign body:   [None]





X-ray Knee 3V Interpreted by me: 


Bones:                  Shattered comminuted distal femur above prosthesis


Joints:                  [No dislocation]


Foreign body:         [None]











Medical decision make: Patient will be admitted for orthopedic management given 


fracture.  Pain controlled here in the ER





Departure


Diagnosis:  


   Primary Impression:  


   Knee fracture


Condition:  Stable











JONN KUHN             Jun 17, 2019 02:21

## 2019-06-17 NOTE — HP
Date/Time of Note


Date/Time of Note


DATE: 19 


TIME: 02:20





Assessment/Plan


VTE Prophylaxis


SCD applied (from Nsg):  Yes


Pharmacological prophylaxis:  NA/contraindicated


Pharm contraindication:  low risk/ambulating





Lines/Catheters


IV Catheter Type (from Nrsg):  Saline Lock





Assessment/Plan


Hospital Course


 This is a 59-year female being admitted to the [facility] floor for:





#1 right pyelonephritis with hydrate ureteronephrosis: Secondary to obstructing 


calculus.  Ceftriaxone 1 g every 24 hours.  Await urine culture results.  IV 


fluid hydration. 





#2 nephrolithiasis: Patient has obstructing calculus in the right distal ureter 


at the ureterovesical junction with associated mild right hydronephrosis and hy


droureter.  Will provide the patient with antibiotics as per #1.  Aggressive IV 


fluid hydration with normal saline.  Flomax daily.  Pain control.  Straining of 


the urine.  Patient also has bilateral noncritical renal calculi.  Will consult 


.





#3 DVT GI prophylaxis: SCDs, no GI prophylaxis indicated





Further treatment strategy will be implemented as per the clinical course.


Result Diagram:  


19





Results 24hrs





Laboratory Tests


     Test
                                19
22:27       19
22:37


     White Blood Count                            5.4


     Red Blood Count                             4.86


     Hemoglobin                                  14.6


     Hematocrit                                  43.6


     Mean Corpuscular Volume                     89.7


     Mean Corpuscular Hemoglobin                 30.0


     Mean Corpuscular Hemoglobin
Concent        33.5  
  



     Red Cell Distribution Width                 12.3


     Platelet Count                               180


     Mean Platelet Volume                       11.7  H


     Immature Granulocytes %                    0.200


     Neutrophils %                               51.0


     Lymphocytes %                               35.6


     Monocytes %                                  9.3


     Eosinophils %                                3.0


     Basophils %                                  0.9


     Nucleated Red Blood Cells %                  0.0


     Immature Granulocytes #                    0.010


     Neutrophils #                                2.8


     Lymphocytes #                                1.9


     Monocytes #                                  0.5


     Eosinophils #                                0.2


     Basophils #                                  0.1


     Nucleated Red Blood Cells #                  0.0


     Sodium Level                                 144


     Potassium Level                              3.9


     Chloride Level                              111  H


     Carbon Dioxide Level                          26


     Anion Gap                                      7


     Blood Urea Nitrogen                          21  H


     Creatinine                                  0.91


     Est Glomerular Filtrat Rate
mL/min   > 60  
        



     Glucose Level                                111


     Calcium Level                                9.5


     Total Bilirubin                              0.3


     Direct Bilirubin                            0.00


     Indirect Bilirubin                           0.3


     Aspartate Amino Transf
(AST/SGOT)            23  
  



     Alanine Aminotransferase
(ALT/SGPT)          28  
  



     Alkaline Phosphatase                         115


     Total Protein                                7.2


     Albumin                                      4.1


     Globulin                                    3.10


     Albumin/Globulin Ratio                      1.32


     Lipase                                        60


     Urine Color                                         YELLOW


     Urine Clarity
                       
              SLIGHTLY
CLOUDY  A


     Urine pH                                                         7.0


     Urine Specific Gravity                                         1.017


     Urine Ketones                                       NEGATIVE


     Urine Nitrite                                       NEGATIVE


     Urine Bilirubin                                     NEGATIVE


     Urine Urobilinogen                                               1+  H


     Urine Leukocyte Esterase                                         1+  H


     Urine Microscopic RBC                               > 182  H


     Urine Microscopic WBC                                            17  H


     Urine Calcium Oxalate Crystals                      FEW  A


     Urine Hemoglobin                                                 3+  H


     Urine Glucose                                       NEGATIVE


     Urine Total Protein                                 NEGATIVE








HPI/ROS


Admit Date/Time


Admit Date/Time








Hx of Present Illness


Chief complaint: Suprapubic pain x2 days, right flank pain





This is a 59-year-old male with a past medical history of kidney stones who 


presented to the emergency department complaining of 2 days of suprapubic pain. 


Patient has a history of kidney stones and has been following up with her 


urologist as an outpatient  she has had procedures done in the past to


remove the kidney stones along with stents.  Her recent stones were thought to 


be small and would pass on their own.  Patient reports that on Friday she 


started experiencing suprapubic pain along with some pain in her back.  She 


reports the pain is constant.  She also reported that she has had urinary 


frequency as well as dysuria.  She denies any chest pain or nausea vomiting or 


fevers.





Allergies: Latex, Levaquin





Medications: None





ROS


Const: As per HPI


Eyes : No pain discharge or redness or change in visual acuity


ENT: No pain, sore throat, congestion, congestion,  dysphagia or discharge


Respiratory: No shortness of breath, cough, sputum, wheezing, or pleuritic pain


Cardiovascular: No chest pain, palpitation, PND, or edema


GI : no change in appetite, abdominal pain, nausea, vomiting, diarrhea, 


constipation, or change in the color his stool 


Genitourinary: As per HPI


Musculoskeletal: No joint pain, back pain, neck pain, restricted range of motion


in neck or joints


Skin: No rash, bruising or hives 


Neuro: No headache, dizziness, syncope, seizure, focal weakness


Endocrine: No polyuria, polydipsia, temperature intolerance


Psych: No hallucination, depression, anxiety or suicidal ideation





PMH/Family/Social


Past Medical History


Recurrent nephrolithiasis


Medications





Current Medications


Ondansetron HCl (Zofran Inj) 4 mg BRIDGE ORDER PRN IV NAUSEA/VOMITING;  Start 


19 at 02:00;  Stop 19 at 01:59


Acetaminophen (Tylenol Tab) 650 mg ER BRIDGE PRN PO .MILD PAIN 1-3 OR TEMP;  


Start 19 at 02:00;  Stop 19 at 01:59


Coded Allergies:  


     latex (Unverified  Allergy, Unknown, 19)


     levofloxacin (Unverified  Allergy, Unknown, 19)





Past Surgical History


Previous kidney stone surgery,  x4, hysterectomy, oophorectomy


Past Surgical Hx:  other





Family History


Significant Family History:  no pertinent family hx





Social History


Alcohol Use:  none


Smoking Status:  Never smoker


Drug Use:  none





Exam/Review of Systems


Vital Signs


Vitals





Vital Signs


  Date      Temp  Pulse  Resp  B/P (MAP)   Pulse Ox  O2          O2 Flow    FiO2


Time                                                 Delivery    Rate


   19  98.4     81    20      165/82        99  Room Air


     22:05                          (109)








Exam


Exam





General: Patient is a pleasant female currently lying in bed in mild distress 


from pain


HEENT: Atraumatic, normocephalic. The pupils are equal, round and reactive. 


Extraocular motor are intact


Neck: Supple with full range of motion. No rigidity or meningismus


Chest: Nontender


Lungs: Clear to auscultation bilaterally no crackles rales or wheezing


Heart: Normal S1-S2, Regular rhythm and rate. No murmur, S3, or S4


Abdomen: Soft , nontender,  nondistended , bowel sounds are present. No guarding


no rebound tenderness , No masses or organomegaly. 


Genitourinary: Suprapubic tenderness palpation, right flank tenderness to 


palpation


Extremities: Normal to inspection, no edema no cyanosis


Neurologic: Normal mental status, speech normal, cranial nerves II through XII 


are intact, motor and sensory are intact, no focal weakness


Additional Comments


PROCEDURE: CT Abdomen and Pelvis without contrast.


 


CLINICAL INDICATION:  Pain.


 


TECHNIQUE:  CT scan of the abdomen and pelvis was performed on a multidetector 


slice CT scanner. No intravenous contrast material was utilized. Sagittal and 


coronal reformatted images were obtained from the axial source images. Images 


were reviewed on a high-resolution PACS workstation. Exam CTDlvol = 16 mGy and 


DLP = 941 Gy-cm. One of the following 3 dose reduction techniques were used: 


Automated exposure control; adjustment of the mA and/or kV according to patient 


size; or use of iterative reconstruction technique.  DICOM images are available.


 


COMPARISON:  2019.


 


FINDINGS:


 


There is no bowel obstruction or ileus.  The appendix is visualized.  There is 


no evidence for appendicitis.  There is diffuse left and sigmoid colon 


diverticulosis without evidence for diverticulitis. There is no free fluid.


 


The liver is overall normal in size. No intrahepatic lesions are identified. The


gallbladder is normal in appearance. There is no definite biliary ductal 


dilation. Pancreas is normal in appearance. The spleen is unremarkable.  There 


are no adrenal masses. The aorta is normal caliber.  Atherosclerotic vascular 


calcifications are present.


 


There is mild right hydronephrosis and diffuse hydroureter to the level of a 3 x


3.1 mm calculus in the distal right ureter at the ureterovesicular junction. 


Immediately proximal the right ureter there is a 1 mm calculus. There are 


multiple bilateral nonobstructing renal calcifications. Kidneys are otherwise 


normal in appearance.  Left ureter is normal appearance..  The urinary bladder 


is normal in appearance.


 


Uterus is unremarkable.  The ovaries are not well characterized.


  


Limited evaluation of the lung bases demonstrates bibasilar atelectasis.


 


There are degenerative changes of the lumbar spine.


 


IMPRESSION:


 


1.  Obstructing calculus in the distal right ureter at the ureterovesicular 


junction with associated mild right hydronephrosis and hydroureter. Additional 


small calculus within the distal right ureter medially adjacent to the obs


tructing calculus.


2.  Bilateral nonobstructing renal calculi.


3.  Diffuse left and sigmoid colon diverticulosis without diverticulitis.


4.  Otherwise no change.


 


 


RPTAT:  HMVK


_____________________________________________ 


.Tye Garcia MD, MD           Date    Time 


Electronically viewed and signed by .Tye Garcia MD, MD on 2019 00:00 


 


D:  2019 00:00  T:  2019 00:00


.K/





CC: JONN KUHN





[id number]











AMADOU GAINES                 2019 02:27

## 2019-06-17 NOTE — CONS
Assessment/Plan


Assessment/Plan


Hospital Course (Demo Recall)


59-year-old female with known history of kidney stones presented to the 


emergency room with right flank pain.  She underwent a CT scan of the abdomen 


and pelvis and that showed a 3 mm stone at the right ureterovesical junction 


causing obstruction and hydronephrosis.  The patient has had stones in the past 


and has undergone left extracorporeal shockwave lithotripsy to a left ureterop


elvic junction stone.  Patient does have right lower quadrant pain and right 


flank pain.  The CT scan showed:


1.  Obstructing calculus in the distal right ureter at the ureterovesicular junc


tion with associated mild right hydronephrosis and hydroureter. Additional small


calculus within the distal right ureter medially adjacent to the obstructing 


calculus.


2.  Bilateral nonobstructing renal calculi.


3.  Diffuse left and sigmoid colon diverticulosis without diverticulitis.


4.  Otherwise no change


The stone size is 3 x 3.1 mm at the ureterovesical junction.  The patient should


be able to pass the stone.  Plan is to give her pain medications, get a KUB, she


is already on tamsulosin, strain the urine and encourage p.o. fluids.





Consultation Date/Type/Reason


Admit Date/Time





Date of Consultation:  Jun 17, 2019


Type of Consult


Urology


Reason for Consultation


Distal right ureteral stone


Requesting Provider:  AMADOU GAINES


Date/Time of Note


DATE: 6/17/19 


TIME: 13:09





Hx of Present Illness


59-year-old female with known history of kidney stones presented to the 


emergency room with right flank pain.  She underwent a CT scan of the abdomen 


and pelvis and that showed a 3 mm stone at the right ureterovesical junction 


causing obstruction and hydronephrosis.  The patient has had stones in the past 


and has undergone left extracorporeal shockwave lithotripsy to a left 


ureteropelvic junction stone.  Patient does have right lower quadrant pain and 


right flank pain.  The CT scan showed:


1.  Obstructing calculus in the distal right ureter at the ureterovesicular 


junction with associated mild right hydronephrosis and hydroureter. Additional 


small calculus within the distal right ureter medially adjacent to the 


obstructing calculus.


2.  Bilateral nonobstructing renal calculi.


3.  Diffuse left and sigmoid colon diverticulosis without diverticulitis.


4.  Otherwise no change


Constitutional:  other (Migraine headaches)


Eyes:  no complaints


ENT:  no complaints


Respiratory:  no complaints; 


   No shortness of breath


Cardiovascular:  no complaints


Gastrointestinal:  pain (Right lower quadrant), nausea; 


   No vomiting


Genitourinary:  flank pain (Right side)


Musculoskeletal:  no complaints


Skin:  no complaints


Neurologic:  headache


Lymphatic:  no complaints


Psychological:  no complaints


Immunologic:  no complaints





Past Medical History


Medical History:  other (History of kidney stones)


Home Meds


Active Scripts


Benzonatate* (Tessalon Perle*) 100 Mg Capsule, 100 MG PO Q8H PRN for COUGH, #10 


CAP


   Prov:SILVIO JACOB MD         4/27/19


Prednisone* (Prednisone*) 20 Mg Tab, 40 MG PO DAILY for 4 Days, TAB


   Prov:SILVIO JACOB MD         4/27/19


Ibuprofen* (Motrin*) 600 Mg Tab, 600 MG PO Q6H PRN for PAIN AND OR ELEVATED 


TEMP, #30 TAB


   Prov:MK PEDRO MD         2/6/19


Reported Medications


Topiramate* (Topiramate*) 100 Mg Tablet, 100 MG PO BID, TAB


   2/6/19


Montelukast Sodium* (Montelukast Sodium*) 10 Mg Tablet, 10 MG PO QHS, #30 TAB


   2/6/19


Albuterol Sulfate* (Albuterol Sulfate* Neb) 0.083%-3 Ml Neb, 2.5 MG NEB Q6H PRN 


for WHEEZING AND SOB, #30 VIAL


   2/6/19


Albuterol Sulfate* (Ventolin HFA*) 18 Gm Hfa.aer.ad, 2 PUFF INHALATION Q6H, #1 


INHALER


   2/6/19


Hydrocodone/Acetaminophen (Norco  Tablet) 1 Each Tablet, 1 EACH PO Q6H, 


TAB


   2/6/19


Zolpidem Tartrate* (Zolpidem Tartrate*) 5 Mg Tablet, 5 MG PO QHS PRN for 


INSOMNIA, #30 TAB


   2/6/19


Sumatriptan Succinate* (Sumatriptan Succinate* Inj) 6 Mg/0.5 Ml Cartridge, 6 MG 


SQ Q12H PRN for MIGRAINE, EA


   MAX 6 mg/dose, repeat in 1 hour if needed. MAX 12 mg/24 hours


    AS NEEDED


   2/6/19


Fluticasone Propionate* (Fluticasone Propionate* Nasal) 50 Mcg/Spray - 16 Gm 


[place].susp, 1 SPRAY NASAL BID, #1 BOTTLE


   TO EACH NOSTRIL


   2/6/19


Medications





Current Medications


Sodium Chloride 1,000 ml @  100 mls/hr Q10H IV  Last administered on 6/17/19at 


03:22; Admin Dose 100 MLS/HR;  Start 6/17/19 at 02:28


IV Flush (NS 3 ml) 3 ml PER PROTOCOL IV ;  Start 6/17/19 at 02:30


Ondansetron HCl (Zofran Inj) 4 mg Q6H  PRN IV NAUSEA/VOMITING Last administered 


on 6/17/19at 08:56; Admin Dose 4 MG;  Start 6/17/19 at 02:30


Acetaminophen (Tylenol Tab) 650 mg Q6H  PRN PO .PAIN 1-3 OR TEMP;  Start 6/17/19


at 02:30


Hydromorphone HCl (Dilaudid) 0.5 mg Q4H  PRN IV .SEVERE PAIN 7-10 Last admini


stered on 6/17/19at 11:45; Admin Dose 0.5 MG;  Start 6/17/19 at 02:30


Docusate Sodium (Colace) 100 mg Q12H  PRN PO .CONSTIPATION;  Start 6/17/19 at 


02:30


Bisacodyl (Dulcolax) 5 mg DAILY  PRN PO .CONSTIPATION;  Start 6/17/19 at 02:30


Ketorolac Tromethamine (Toradol) 15 mg Q6H IV  Last administered on 6/17/19at 


09:13; Admin Dose 15 MG;  Start 6/17/19 at 02:30;  Stop 6/18/19 at 02:29


Tamsulosin HCl (Flomax) 0.4 mg HS PO ;  Start 6/17/19 at 21:00


Sumatriptan Succinate (Imitrex) 6 mg DAILY  PRN SC HEADACHE;  Start 6/17/19 at 


12:30


Allergies:  


Coded Allergies:  


     latex (Unverified  Allergy, Unknown, 4/27/19)


     levofloxacin (Unverified  Allergy, Unknown, 4/27/19)





Past Surgical History


Past Surgical Hx:  other (Left extracorporeal shockwave lithotripsy, 4 C-


sections, hysterectomy and bilateral oophorectomy, nose surgery, bilateral car


pal tunnel surgery)





Social History


Alcohol Use:  none


Smoking Status:  Never smoker


Drug Use:  none





Exam/Review of Systems


Exam


Vitals





Vital Signs


  Date      Temp  Pulse  Resp  B/P (MAP)   Pulse Ox  O2          O2 Flow    FiO2


Time                                                 Delivery    Rate


   6/17/19  97.4     61    19      101/52        98  Room Air


     08:27                           (68)








Intake and Output





6/16/19 6/16/19 6/17/19





1515:00


23:00


07:00





IntakeIntake Total


250 ml





BalanceBalance


250 ml











Constitutional:  alert, oriented


Psych:  no complaints


Head:  normocephalic


Eyes:  nl conjunctiva


ENMT:  nl external ears & nose


Neck:  supple


Respiratory:  clear to auscultation; 


   No wheezing


Cardiovascular:  regular rate and rhythm; 


   No jugular venous distention (JVD)


Gastrointestinal:  soft, tender (Right lower quadrant)


Genitourinary - Female:  CVA tenderness (Right flank)


Neurological:  nl mental status


Skin:  nl turgor





Results


Result Diagram:  


6/17/19 0439                                                                    


           6/17/19 0439





Results 24hrs





Laboratory Tests


Test
                           6/16/19
22:27       6/16/19
22:37  6/17/19
04:39


White Blood Count                       5.4                               4.4  L


Red Blood Count                        4.86                               4.59


Hemoglobin                             14.6                               13.8


Hematocrit                             43.6                               41.6


Mean Corpuscular Volume                89.7                               90.6


Mean Corpuscular Hemoglobin            30.0                               30.1


Mean Corpuscular                      33.5  
  
                         33.2  



Hemoglobin
Concent


Red Cell Distribution Width            12.3                               12.4


Platelet Count                          180                                177


Mean Platelet Volume                  11.7  H                            12.2  H


Immature Granulocytes %               0.200                              0.200


Neutrophils %                          51.0                               40.3


Lymphocytes %                          35.6                               42.7


Monocytes %                             9.3                               10.5


Eosinophils %                           3.0                                5.2


Basophils %                             0.9                                1.1


Nucleated Red Blood Cells %             0.0                                0.0


Immature Granulocytes #               0.010                              0.010


Neutrophils #                           2.8                                1.8


Lymphocytes #                           1.9                                1.9


Monocytes #                             0.5                                0.5


Eosinophils #                           0.2                                0.2


Basophils #                             0.1                                0.1


Nucleated Red Blood Cells #             0.0                                0.0


Sodium Level                            144                                144


Potassium Level                         3.9                                3.9


Chloride Level                         111  H                             115  H


Carbon Dioxide Level                     26                                 23


Anion Gap                                 7                                  6


Blood Urea Nitrogen                     21  H                               19


Creatinine                             0.91                               0.82


Est Glomerular Filtrat          > 60  
        
                   > 60  



Rate
mL/min


Glucose Level                           111                                111


Calcium Level                           9.5                                9.0


Total Bilirubin                         0.3                                0.3


Direct Bilirubin                       0.00                               0.00


Indirect Bilirubin                      0.3                                0.3


Aspartate Amino                         23  
  
                           21  



Transf
(AST/SGOT)


Alanine                                 28  
  
                           21  



Aminotransferase
(ALT/SGPT)


Alkaline Phosphatase                    115                                 96


Total Protein                           7.2                              6.0  #L


Albumin                                 4.1                                3.6


Globulin                               3.10                               2.40


Albumin/Globulin Ratio                 1.32                               1.50


Lipase                                   60


Urine Color                                    YELLOW


Urine Clarity
                  
              SLIGHTLY
CLOUDY  A  



Urine pH                                                    7.0


Urine Specific Gravity                                    1.017


Urine Ketones                                  NEGATIVE


Urine Nitrite                                  NEGATIVE


Urine Bilirubin                                NEGATIVE


Urine Urobilinogen                                          1+  H


Urine Leukocyte Esterase                                    1+  H


Urine Microscopic RBC                          > 182  H


Urine Microscopic WBC                                       17  H


Urine Calcium Oxalate Crystals                 FEW  A


Urine Hemoglobin                                            3+  H


Urine Glucose                                  NEGATIVE


Urine Total Protein                            NEGATIVE


Prothrombin Time                                                          12.0


Prothrombin Time Ratio                                                     0.9


INR International               
              
                         0.88  



Normalized
Ratio


Activated Partial
Thromboplast  
              
                         27.3  



Time


Hemoglobin A1c                                                             5.4


Magnesium Level                                                            2.3


Triglycerides Level                                                         98


Cholesterol Level                                                         232  H


LDL Cholesterol, Calculated                                                166


HDL Cholesterol                                                             46


Cholesterol/HDL Ratio                                                      5.0


Thyroid Stimulating             
              
                        2.710  



Hormone
(TSH)








Imaging


Imaging


CT scan of the abdomen and pelvis:


1.  Obstructing calculus in the distal right ureter at the ureterovesicular 


junction with associated mild right hydronephrosis and hydroureter. Additional 


small calculus within the distal right ureter medially adjacent to the 


obstructing calculus.


2.  Bilateral nonobstructing renal calculi.


3.  Diffuse left and sigmoid colon diverticulosis without diverticulitis.


4.  Otherwise no change





Medications


Medication





Current Medications


Sodium Chloride 1,000 ml @  100 mls/hr Q10H IV  Last administered on 6/17/19at 


03:22; Admin Dose 100 MLS/HR;  Start 6/17/19 at 02:28


IV Flush (NS 3 ml) 3 ml PER PROTOCOL IV ;  Start 6/17/19 at 02:30


Ondansetron HCl (Zofran Inj) 4 mg Q6H  PRN IV NAUSEA/VOMITING Last administered 


on 6/17/19at 08:56; Admin Dose 4 MG;  Start 6/17/19 at 02:30


Acetaminophen (Tylenol Tab) 650 mg Q6H  PRN PO .PAIN 1-3 OR TEMP;  Start 6/17/19


at 02:30


Hydromorphone HCl (Dilaudid) 0.5 mg Q4H  PRN IV .SEVERE PAIN 7-10 Last 


administered on 6/17/19at 11:45; Admin Dose 0.5 MG;  Start 6/17/19 at 02:30


Docusate Sodium (Colace) 100 mg Q12H  PRN PO .CONSTIPATION;  Start 6/17/19 at 


02:30


Bisacodyl (Dulcolax) 5 mg DAILY  PRN PO .CONSTIPATION;  Start 6/17/19 at 02:30


Ketorolac Tromethamine (Toradol) 15 mg Q6H IV  Last administered on 6/17/19at 


09:13; Admin Dose 15 MG;  Start 6/17/19 at 02:30;  Stop 6/18/19 at 02:29


Tamsulosin HCl (Flomax) 0.4 mg HS PO ;  Start 6/17/19 at 21:00


Sumatriptan Succinate (Imitrex) 6 mg DAILY  PRN SC HEADACHE;  Start 6/17/19 at 


12:30











LOS BELLO MD            Jun 17, 2019 13:19

## 2019-06-17 NOTE — PN
Date/Time of Note


Date/Time of Note


DATE: 6/17/19 


TIME: 15:56





Assessment/Plan


VTE Prophylaxis


Risk score (from Ns)>0 risk:  1


SCD applied (from [facility] [place]  Yes


Pharmacological prophylaxis:  NA/contraindicated


Pharm contraindication:  low risk/ambulating





Lines/Catheters


IV Catheter Type (from [facility]):  Peripheral IV


Urinary Cath still in place:  No





Assessment/Plan


Hospital Course


1.  Right pyelonephritis with hydrate ureteronephrosis: Secondary to obstructing


calculus


Continue IV antibiotics and fluids


Follow-up cultures





2.  Nephrolithiasis


Patient with obstructing calculus in the right distal ureter at the UVJ junction


with mild right hydronephrosis and hydroureter


Urology consultation obtained


Continue Flomax


Pain control


Patient with a history of lithotripsy and stent placement with Dr. Olivares





Prophylaxis: SCDs


Result Diagram:  


6/17/19 0439                                                                    


           6/17/19 0439





Results 24hrs





Laboratory Tests


Test
                           6/16/19
22:27       6/16/19
22:37  6/17/19
04:39


White Blood Count                       5.4                               4.4  L


Red Blood Count                        4.86                               4.59


Hemoglobin                             14.6                               13.8


Hematocrit                             43.6                               41.6


Mean Corpuscular Volume                89.7                               90.6


Mean Corpuscular Hemoglobin            30.0                               30.1


Mean Corpuscular                      33.5  
  
                         33.2  



Hemoglobin
Concent


Red Cell Distribution Width            12.3                               12.4


Platelet Count                          180                                177


Mean Platelet Volume                  11.7  H                            12.2  H


Immature Granulocytes %               0.200                              0.200


Neutrophils %                          51.0                               40.3


Lymphocytes %                          35.6                               42.7


Monocytes %                             9.3                               10.5


Eosinophils %                           3.0                                5.2


Basophils %                             0.9                                1.1


Nucleated Red Blood Cells %             0.0                                0.0


Immature Granulocytes #               0.010                              0.010


Neutrophils #                           2.8                                1.8


Lymphocytes #                           1.9                                1.9


Monocytes #                             0.5                                0.5


Eosinophils #                           0.2                                0.2


Basophils #                             0.1                                0.1


Nucleated Red Blood Cells #             0.0                                0.0


Sodium Level                            144                                144


Potassium Level                         3.9                                3.9


Chloride Level                         111  H                             115  H


Carbon Dioxide Level                     26                                 23


Anion Gap                                 7                                  6


Blood Urea Nitrogen                     21  H                               19


Creatinine                             0.91                               0.82


Est Glomerular Filtrat          > 60  
        
                   > 60  



Rate
mL/min


Glucose Level                           111                                111


Calcium Level                           9.5                                9.0


Total Bilirubin                         0.3                                0.3


Direct Bilirubin                       0.00                               0.00


Indirect Bilirubin                      0.3                                0.3


Aspartate Amino                         23  
  
                           21  



Transf
(AST/SGOT)


Alanine                                 28  
  
                           21  



Aminotransferase
(ALT/SGPT)


Alkaline Phosphatase                    115                                 96


Total Protein                           7.2                              6.0  #L


Albumin                                 4.1                                3.6


Globulin                               3.10                               2.40


Albumin/Globulin Ratio                 1.32                               1.50


Lipase                                   60


Urine Color                                    YELLOW


Urine Clarity
                  
              SLIGHTLY
CLOUDY  A  



Urine pH                                                    7.0


Urine Specific Gravity                                    1.017


Urine Ketones                                  NEGATIVE


Urine Nitrite                                  NEGATIVE


Urine Bilirubin                                NEGATIVE


Urine Urobilinogen                                          1+  H


Urine Leukocyte Esterase                                    1+  H


Urine Microscopic RBC                          > 182  H


Urine Microscopic WBC                                       17  H


Urine Calcium Oxalate Crystals                 FEW  A


Urine Hemoglobin                                            3+  H


Urine Glucose                                  NEGATIVE


Urine Total Protein                            NEGATIVE


Prothrombin Time                                                          12.0


Prothrombin Time Ratio                                                     0.9


INR International               
              
                         0.88  



Normalized
Ratio


Activated Partial
Thromboplast  
              
                         27.3  



Time


Hemoglobin A1c                                                             5.4


Magnesium Level                                                            2.3


Triglycerides Level                                                         98


Cholesterol Level                                                         232  H


LDL Cholesterol, Calculated                                                166


HDL Cholesterol                                                             46


Cholesterol/HDL Ratio                                                      5.0


Thyroid Stimulating             
              
                        2.710  



Hormone
(TSH)








Subjective


24 Hr Interval Summary


Neurologic:  headache





Exam/Review of Systems


Exam


Vitals





Vital Signs


  Date      Temp  Pulse  Resp  B/P (MAP)   Pulse Ox  O2          O2 Flow    FiO2


Time                                                 Delivery    Rate


   6/17/19  97.7     62    18      117/60        98  Room Air


     14:54                           (79)








Intake and Output





6/16/19 6/16/19 6/17/19





1515:00


23:00


07:00





IntakeIntake Total


250 ml





BalanceBalance


250 ml











Constitutional:  alert, oriented


Respiratory:  clear to auscultation


Cardiovascular:  regular rate and rhythm


Gastrointestinal:  soft; 


   No distended


Musculoskeletal:  nl extremities to inspection





Results


Results 24hrs





Laboratory Tests


Test
                           6/16/19
22:27       6/16/19
22:37  6/17/19
04:39


White Blood Count                       5.4                               4.4  L


Red Blood Count                        4.86                               4.59


Hemoglobin                             14.6                               13.8


Hematocrit                             43.6                               41.6


Mean Corpuscular Volume                89.7                               90.6


Mean Corpuscular Hemoglobin            30.0                               30.1


Mean Corpuscular                      33.5  
  
                         33.2  



Hemoglobin
Concent


Red Cell Distribution Width            12.3                               12.4


Platelet Count                          180                                177


Mean Platelet Volume                  11.7  H                            12.2  H


Immature Granulocytes %               0.200                              0.200


Neutrophils %                          51.0                               40.3


Lymphocytes %                          35.6                               42.7


Monocytes %                             9.3                               10.5


Eosinophils %                           3.0                                5.2


Basophils %                             0.9                                1.1


Nucleated Red Blood Cells %             0.0                                0.0


Immature Granulocytes #               0.010                              0.010


Neutrophils #                           2.8                                1.8


Lymphocytes #                           1.9                                1.9


Monocytes #                             0.5                                0.5


Eosinophils #                           0.2                                0.2


Basophils #                             0.1                                0.1


Nucleated Red Blood Cells #             0.0                                0.0


Sodium Level                            144                                144


Potassium Level                         3.9                                3.9


Chloride Level                         111  H                             115  H


Carbon Dioxide Level                     26                                 23


Anion Gap                                 7                                  6


Blood Urea Nitrogen                     21  H                               19


Creatinine                             0.91                               0.82


Est Glomerular Filtrat          > 60  
        
                   > 60  



Rate
mL/min


Glucose Level                           111                                111


Calcium Level                           9.5                                9.0


Total Bilirubin                         0.3                                0.3


Direct Bilirubin                       0.00                               0.00


Indirect Bilirubin                      0.3                                0.3


Aspartate Amino                         23  
  
                           21  



Transf
(AST/SGOT)


Alanine                                 28  
  
                           21  



Aminotransferase
(ALT/SGPT)


Alkaline Phosphatase                    115                                 96


Total Protein                           7.2                              6.0  #L


Albumin                                 4.1                                3.6


Globulin                               3.10                               2.40


Albumin/Globulin Ratio                 1.32                               1.50


Lipase                                   60


Urine Color                                    YELLOW


Urine Clarity
                  
              SLIGHTLY
CLOUDY  A  



Urine pH                                                    7.0


Urine Specific Gravity                                    1.017


Urine Ketones                                  NEGATIVE


Urine Nitrite                                  NEGATIVE


Urine Bilirubin                                NEGATIVE


Urine Urobilinogen                                          1+  H


Urine Leukocyte Esterase                                    1+  H


Urine Microscopic RBC                          > 182  H


Urine Microscopic WBC                                       17  H


Urine Calcium Oxalate Crystals                 FEW  A


Urine Hemoglobin                                            3+  H


Urine Glucose                                  NEGATIVE


Urine Total Protein                            NEGATIVE


Prothrombin Time                                                          12.0


Prothrombin Time Ratio                                                     0.9


INR International               
              
                         0.88  



Normalized
Ratio


Activated Partial
Thromboplast  
              
                         27.3  



Time


Hemoglobin A1c                                                             5.4


Magnesium Level                                                            2.3


Triglycerides Level                                                         98


Cholesterol Level                                                         232  H


LDL Cholesterol, Calculated                                                166


HDL Cholesterol                                                             46


Cholesterol/HDL Ratio                                                      5.0


Thyroid Stimulating             
              
                        2.710  



Hormone
(TSH)








Medications


Medication





Current Medications


Sodium Chloride 1,000 ml @  100 mls/hr Q10H IV  Last administered on 6/17/19at 


13:17; Admin Dose 100 MLS/HR;  Start 6/17/19 at 02:28


IV Flush (NS 3 ml) 3 ml PER PROTOCOL IV ;  Start 6/17/19 at 02:30


Ondansetron HCl (Zofran Inj) 4 mg Q6H  PRN IV NAUSEA/VOMITING Last administered 


on 6/17/19at 08:56; Admin Dose 4 MG;  Start 6/17/19 at 02:30


Acetaminophen (Tylenol Tab) 650 mg Q6H  PRN PO .PAIN 1-3 OR TEMP;  Start 6/17/19


at 02:30


Hydromorphone HCl (Dilaudid) 0.5 mg Q4H  PRN IV .SEVERE PAIN 7-10 Last a


dministered on 6/17/19at 11:45; Admin Dose 0.5 MG;  Start 6/17/19 at 02:30


Docusate Sodium (Colace) 100 mg Q12H  PRN PO .CONSTIPATION;  Start 6/17/19 at 


02:30


Bisacodyl (Dulcolax) 5 mg DAILY  PRN PO .CONSTIPATION;  Start 6/17/19 at 02:30


Ketorolac Tromethamine (Toradol) 15 mg Q6H IV  Last administered on 6/17/19at 


15:44; Admin Dose 15 MG;  Start 6/17/19 at 02:30;  Stop 6/18/19 at 02:29


Tamsulosin HCl (Flomax) 0.4 mg HS PO ;  Start 6/17/19 at 21:00


Sumatriptan Succinate (Imitrex) 6 mg DAILY  PRN SC HEADACHE Last administered on


6/17/19at 13:17; Admin Dose 6 MG;  Start 6/17/19 at 12:30











MARGOTH DANIEL                  Jun 17, 2019 15:58

## 2019-06-18 VITALS — SYSTOLIC BLOOD PRESSURE: 125 MMHG | HEART RATE: 72 BPM | RESPIRATION RATE: 18 BRPM | DIASTOLIC BLOOD PRESSURE: 67 MMHG

## 2019-06-18 VITALS — SYSTOLIC BLOOD PRESSURE: 132 MMHG | HEART RATE: 72 BPM | RESPIRATION RATE: 18 BRPM | DIASTOLIC BLOOD PRESSURE: 77 MMHG

## 2019-06-18 VITALS — DIASTOLIC BLOOD PRESSURE: 70 MMHG | HEART RATE: 76 BPM | SYSTOLIC BLOOD PRESSURE: 148 MMHG | RESPIRATION RATE: 20 BRPM

## 2019-06-18 VITALS — RESPIRATION RATE: 17 BRPM | HEART RATE: 68 BPM | SYSTOLIC BLOOD PRESSURE: 141 MMHG | DIASTOLIC BLOOD PRESSURE: 82 MMHG

## 2019-06-18 LAB
ADD MAN DIFF?: NO
ALANINE AMINOTRANSFERASE: 28 IU/L (ref 13–69)
ALBUMIN/GLOBULIN RATIO: 1.44
ALBUMIN: 3.6 G/DL (ref 3.3–4.9)
ALKALINE PHOSPHATASE: 79 IU/L (ref 42–121)
ANION GAP: 7 (ref 5–13)
ASPARTATE AMINO TRANSFERASE: 25 IU/L (ref 15–46)
BASOPHIL #: 0.1 10^3/UL (ref 0–0.1)
BASOPHILS %: 0.8 % (ref 0–2)
BILIRUBIN,DIRECT: 0 MG/DL (ref 0–0.2)
BILIRUBIN,TOTAL: 0.4 MG/DL (ref 0.2–1.3)
BLOOD UREA NITROGEN: 20 MG/DL (ref 7–20)
CALCIUM: 9 MG/DL (ref 8.4–10.2)
CARBON DIOXIDE: 21 MMOL/L (ref 21–31)
CHLORIDE: 116 MMOL/L (ref 97–110)
CREATININE: 1.09 MG/DL (ref 0.44–1)
EOSINOPHILS #: 0.2 10^3/UL (ref 0–0.5)
EOSINOPHILS %: 2.9 % (ref 0–7)
GLOBULIN: 2.5 G/DL (ref 1.3–3.2)
GLUCOSE: 109 MG/DL (ref 70–220)
HEMATOCRIT: 44.3 % (ref 37–47)
HEMOGLOBIN: 14.3 G/DL (ref 12–16)
IMMATURE GRANS #M: 0.01 10^3/UL (ref 0–0.03)
IMMATURE GRANS % (M): 0.2 % (ref 0–0.43)
LYMPHOCYTES #: 1.6 10^3/UL (ref 0.8–2.9)
LYMPHOCYTES %: 26.5 % (ref 15–51)
MEAN CORPUSCULAR HEMOGLOBIN: 30 PG (ref 29–33)
MEAN CORPUSCULAR HGB CONC: 32.3 G/DL (ref 32–37)
MEAN CORPUSCULAR VOLUME: 93.1 FL (ref 82–101)
MEAN PLATELET VOLUME: 12.3 FL (ref 7.4–10.4)
MONOCYTE #: 0.5 10^3/UL (ref 0.3–0.9)
MONOCYTES %: 8.5 % (ref 0–11)
NEUTROPHIL #: 3.8 10^3/UL (ref 1.6–7.5)
NEUTROPHILS %: 61.1 % (ref 39–77)
NUCLEATED RED BLOOD CELLS #: 0 10^3/UL (ref 0–0)
NUCLEATED RED BLOOD CELLS%: 0 /100WBC (ref 0–0)
PLATELET COUNT: 169 10^3/UL (ref 140–415)
POTASSIUM: 4.2 MMOL/L (ref 3.5–5.1)
RED BLOOD COUNT: 4.76 10^6/UL (ref 4.2–5.4)
RED CELL DISTRIBUTION WIDTH: 12.3 % (ref 11.5–14.5)
SODIUM: 144 MMOL/L (ref 135–144)
TOTAL PROTEIN: 6.1 G/DL (ref 6.1–8.1)
WHITE BLOOD COUNT: 6.2 10^3/UL (ref 4.8–10.8)

## 2019-06-18 RX ADMIN — HYDROMORPHONE HYDROCHLORIDE PRN MG: 1 INJECTION, SOLUTION INTRAMUSCULAR; INTRAVENOUS; SUBCUTANEOUS at 10:56

## 2019-06-18 RX ADMIN — HYDROMORPHONE HYDROCHLORIDE PRN MG: 1 INJECTION, SOLUTION INTRAMUSCULAR; INTRAVENOUS; SUBCUTANEOUS at 02:43

## 2019-06-18 RX ADMIN — HYDROMORPHONE HYDROCHLORIDE PRN MG: 1 INJECTION, SOLUTION INTRAMUSCULAR; INTRAVENOUS; SUBCUTANEOUS at 00:45

## 2019-06-18 RX ADMIN — ONDANSETRON HYDROCHLORIDE 1 MG: 2 INJECTION, SOLUTION INTRAMUSCULAR; INTRAVENOUS at 10:55

## 2019-06-18 RX ADMIN — TAMSULOSIN HYDROCHLORIDE SCH MG: 0.4 CAPSULE ORAL at 20:37

## 2019-06-18 RX ADMIN — FOLIC ACID SCH MLS/HR: 5 INJECTION, SOLUTION INTRAMUSCULAR; INTRAVENOUS; SUBCUTANEOUS at 00:45

## 2019-06-18 RX ADMIN — HYDROMORPHONE HYDROCHLORIDE 1 MG: 1 INJECTION, SOLUTION INTRAMUSCULAR; INTRAVENOUS; SUBCUTANEOUS at 10:56

## 2019-06-18 RX ADMIN — DIPHENHYDRAMINE HYDROCHLORIDE 1 MG: 50 INJECTION, SOLUTION INTRAMUSCULAR; INTRAVENOUS at 12:49

## 2019-06-18 RX ADMIN — DEXAMETHASONE SODIUM PHOSPHATE PRN MG: 10 INJECTION, SOLUTION INTRAMUSCULAR; INTRAVENOUS at 10:55

## 2019-06-18 RX ADMIN — HYDROMORPHONE HYDROCHLORIDE 1 MG: 1 INJECTION, SOLUTION INTRAMUSCULAR; INTRAVENOUS; SUBCUTANEOUS at 00:45

## 2019-06-18 RX ADMIN — HYDROMORPHONE HYDROCHLORIDE PRN MG: 1 INJECTION, SOLUTION INTRAMUSCULAR; INTRAVENOUS; SUBCUTANEOUS at 23:25

## 2019-06-18 RX ADMIN — TAMSULOSIN HYDROCHLORIDE 1 MG: 0.4 CAPSULE ORAL at 20:37

## 2019-06-18 RX ADMIN — HYDROMORPHONE HYDROCHLORIDE 1 MG: 1 INJECTION, SOLUTION INTRAMUSCULAR; INTRAVENOUS; SUBCUTANEOUS at 13:41

## 2019-06-18 RX ADMIN — HYDROMORPHONE HYDROCHLORIDE 1 MG: 1 INJECTION, SOLUTION INTRAMUSCULAR; INTRAVENOUS; SUBCUTANEOUS at 02:43

## 2019-06-18 RX ADMIN — THIAMINE HYDROCHLORIDE 1 MLS/HR: 100 INJECTION, SOLUTION INTRAMUSCULAR; INTRAVENOUS at 12:19

## 2019-06-18 RX ADMIN — HYDROMORPHONE HYDROCHLORIDE 1 MG: 1 INJECTION, SOLUTION INTRAMUSCULAR; INTRAVENOUS; SUBCUTANEOUS at 23:25

## 2019-06-18 RX ADMIN — THIAMINE HYDROCHLORIDE 1 MLS/HR: 100 INJECTION, SOLUTION INTRAMUSCULAR; INTRAVENOUS at 00:45

## 2019-06-18 RX ADMIN — DIPHENHYDRAMINE HYDROCHLORIDE PRN MG: 50 INJECTION, SOLUTION INTRAMUSCULAR; INTRAVENOUS at 12:49

## 2019-06-18 RX ADMIN — FOLIC ACID SCH MLS/HR: 5 INJECTION, SOLUTION INTRAMUSCULAR; INTRAVENOUS; SUBCUTANEOUS at 12:19

## 2019-06-18 NOTE — PN
Date/Time of Note


Date/Time of Note


DATE: 6/18/19 


TIME: 17:31





Assessment/Plan


VTE Prophylaxis


Risk score (from Ns)>0 risk:  3


SCD applied (from Ns):  Yes


Pharmacological prophylaxis:  NA/contraindicated


Pharm contraindication:  low risk/ambulating





Lines/Catheters


IV Catheter Type (from [facility]):  Peripheral IV


Urinary Cath still in place:  No





Assessment/Plan


Hospital Course


1.  Right pyelonephritis with hydrate ureteronephrosis: Secondary to obstructing


calculus


Continue IV antibiotics and fluids


Follow-up cultures





2.  Nephrolithiasis


Patient with obstructing calculus in the right distal ureter at the UVJ junction


with mild right hydronephrosis and hydroureter


Urology consultation appreciated, recommendation is to continue IV fluids for 


now anticipate passing of the stone


Continue Flomax


Pain control


Patient with a history of lithotripsy and stent placement with Dr. Olivares





3.  Headache


Continue home Imitrex





Prophylaxis: SCDs


Result Diagram:  


6/18/19 0457                                                                    


           6/18/19 0457





Results 24hrs





Laboratory Tests


               Test
                                6/18/19
04:57


               White Blood Count                           6.2  #


               Red Blood Count                             4.76


               Hemoglobin                                  14.3


               Hematocrit                                  44.3


               Mean Corpuscular Volume                     93.1


               Mean Corpuscular Hemoglobin                 30.0


               Mean Corpuscular Hemoglobin
Concent        32.3  



               Red Cell Distribution Width                 12.3


               Platelet Count                               169


               Mean Platelet Volume                       12.3  H


               Immature Granulocytes %                    0.200


               Neutrophils %                               61.1


               Lymphocytes %                               26.5


               Monocytes %                                  8.5


               Eosinophils %                                2.9


               Basophils %                                  0.8


               Nucleated Red Blood Cells %                  0.0


               Immature Granulocytes #                    0.010


               Neutrophils #                                3.8


               Lymphocytes #                                1.6


               Monocytes #                                  0.5


               Eosinophils #                                0.2


               Basophils #                                  0.1


               Nucleated Red Blood Cells #                  0.0


               Sodium Level                                 144


               Potassium Level                              4.2


               Chloride Level                              116  H


               Carbon Dioxide Level                          21


               Anion Gap                                      7


               Blood Urea Nitrogen                           20


               Creatinine                                 1.09  H


               Est Glomerular Filtrat Rate
mL/min          52  L



               Glucose Level                                109


               Calcium Level                                9.0


               Total Bilirubin                              0.4


               Direct Bilirubin                            0.00


               Indirect Bilirubin                           0.4


               Aspartate Amino Transf
(AST/SGOT)            25  



               Alanine Aminotransferase
(ALT/SGPT)          28  



               Alkaline Phosphatase                          79


               Total Protein                                6.1


               Albumin                                      3.6


               Globulin                                    2.50


               Albumin/Globulin Ratio                      1.44








Subjective


24 Hr Interval Summary


Constitutional:  no complaints





Exam/Review of Systems


Exam


Vitals





Vital Signs


  Date      Temp  Pulse  Resp  B/P (MAP)   Pulse Ox  O2          O2 Flow    FiO2


Time                                                 Delivery    Rate


   6/18/19  97.6     76    20      148/70        98  Room Air


     15:58                           (96)








Intake and Output





6/17/19 6/17/19 6/18/19





1515:00


23:00


07:00





IntakeIntake Total


600 ml


1320 ml


1350 ml





OutputOutput Total


700 ml





BalanceBalance


600 ml


1320 ml


650 ml











Constitutional:  alert, oriented


Respiratory:  clear to auscultation


Cardiovascular:  regular rate and rhythm


Gastrointestinal:  soft; 


   No distended


Musculoskeletal:  nl extremities to inspection





Results


Results 24hrs





Laboratory Tests


               Test
                                6/18/19
04:57


               White Blood Count                           6.2  #


               Red Blood Count                             4.76


               Hemoglobin                                  14.3


               Hematocrit                                  44.3


               Mean Corpuscular Volume                     93.1


               Mean Corpuscular Hemoglobin                 30.0


               Mean Corpuscular Hemoglobin
Concent        32.3  



               Red Cell Distribution Width                 12.3


               Platelet Count                               169


               Mean Platelet Volume                       12.3  H


               Immature Granulocytes %                    0.200


               Neutrophils %                               61.1


               Lymphocytes %                               26.5


               Monocytes %                                  8.5


               Eosinophils %                                2.9


               Basophils %                                  0.8


               Nucleated Red Blood Cells %                  0.0


               Immature Granulocytes #                    0.010


               Neutrophils #                                3.8


               Lymphocytes #                                1.6


               Monocytes #                                  0.5


               Eosinophils #                                0.2


               Basophils #                                  0.1


               Nucleated Red Blood Cells #                  0.0


               Sodium Level                                 144


               Potassium Level                              4.2


               Chloride Level                              116  H


               Carbon Dioxide Level                          21


               Anion Gap                                      7


               Blood Urea Nitrogen                           20


               Creatinine                                 1.09  H


               Est Glomerular Filtrat Rate
mL/min          52  L



               Glucose Level                                109


               Calcium Level                                9.0


               Total Bilirubin                              0.4


               Direct Bilirubin                            0.00


               Indirect Bilirubin                           0.4


               Aspartate Amino Transf
(AST/SGOT)            25  



               Alanine Aminotransferase
(ALT/SGPT)          28  



               Alkaline Phosphatase                          79


               Total Protein                                6.1


               Albumin                                      3.6


               Globulin                                    2.50


               Albumin/Globulin Ratio                      1.44








Medications


Medication





Current Medications


Sodium Chloride 1,000 ml @  100 mls/hr Q10H IV  Last administered on 6/18/19at 


12:19; Admin Dose 100 MLS/HR;  Start 6/17/19 at 02:28


IV Flush (NS 3 ml) 3 ml PER PROTOCOL IV ;  Start 6/17/19 at 02:30


Ondansetron HCl (Zofran Inj) 4 mg Q6H  PRN IV NAUSEA/VOMITING Last administered 


on 6/18/19at 10:55; Admin Dose 4 MG;  Start 6/17/19 at 02:30


Acetaminophen (Tylenol Tab) 650 mg Q6H  PRN PO .PAIN 1-3 OR TEMP;  Start 6/17/19


at 02:30


Docusate Sodium (Colace) 100 mg Q12H  PRN PO .CONSTIPATION;  Start 6/17/19 at 


02:30


Bisacodyl (Dulcolax) 5 mg DAILY  PRN PO .CONSTIPATION;  Start 6/17/19 at 02:30


Tamsulosin HCl (Flomax) 0.4 mg HS PO  Last administered on 6/17/19at 21:53; 


Admin Dose 0.4 MG;  Start 6/17/19 at 21:00


Sumatriptan Succinate (Imitrex) 6 mg DAILY  PRN SC HEADACHE Last administered on


6/17/19at 13:17; Admin Dose 6 MG;  Start 6/17/19 at 12:30


Hydromorphone HCl (Dilaudid) 1 mg Q4H  PRN IV SEVERE PAIN LEVEL 7-10 Last 


administered on 6/18/19at 10:56; Admin Dose 1 MG;  Start 6/18/19 at 02:00


Diphenhydramine HCl (Benadryl) 25 mg Q8  PRN IV ITCHING Last administered on 


6/18/19at 12:49; Admin Dose 25 MG;  Start 6/18/19 at 12:20











MARGOTH DANIEL                  Jun 18, 2019 17:33

## 2019-06-18 NOTE — CONS
Consult Date/Type/Reason


Admit Date/Time


Jun 17, 2019 at 01:32


Initial Consult Date


6/17/19


Type of Consultation:  Urology


Reason for Consultation


Distal right ureteral stone


Requesting Provider:  AMADOU GAINES


Date/Time of Note


DATE: 6/18/19 


TIME: 13:40





Subjective


Patient is having severe pain at the present time in the right suprapubic area 


and lower back.





Objective


Vitals





Vital Signs


  Date      Temp  Pulse  Resp  B/P (MAP)   Pulse Ox  O2          O2 Flow    FiO2


Time                                                 Delivery    Rate


   6/18/19  97.8     72    18      132/77        97  Room Air


     07:29                           (95)








Intake and Output





6/17/19 6/17/19 6/18/19





1515:00


23:00


07:00





IntakeIntake Total


600 ml


1320 ml


1350 ml





OutputOutput Total


700 ml





BalanceBalance


600 ml


1320 ml


650 ml











Exam


Right flank and right lower quadrant pain.  KUB still shows a stone at the right


ureterovesical junction.





Results/Medications


Result Diagram:  


6/18/19 0457                                                                    


           6/18/19 0457





Results 24 hrs





Laboratory Tests


               Test
                                6/18/19
04:57


               White Blood Count                           6.2  #


               Red Blood Count                             4.76


               Hemoglobin                                  14.3


               Hematocrit                                  44.3


               Mean Corpuscular Volume                     93.1


               Mean Corpuscular Hemoglobin                 30.0


               Mean Corpuscular Hemoglobin
Concent        32.3  



               Red Cell Distribution Width                 12.3


               Platelet Count                               169


               Mean Platelet Volume                       12.3  H


               Immature Granulocytes %                    0.200


               Neutrophils %                               61.1


               Lymphocytes %                               26.5


               Monocytes %                                  8.5


               Eosinophils %                                2.9


               Basophils %                                  0.8


               Nucleated Red Blood Cells %                  0.0


               Immature Granulocytes #                    0.010


               Neutrophils #                                3.8


               Lymphocytes #                                1.6


               Monocytes #                                  0.5


               Eosinophils #                                0.2


               Basophils #                                  0.1


               Nucleated Red Blood Cells #                  0.0


               Sodium Level                                 144


               Potassium Level                              4.2


               Chloride Level                              116  H


               Carbon Dioxide Level                          21


               Anion Gap                                      7


               Blood Urea Nitrogen                           20


               Creatinine                                 1.09  H


               Est Glomerular Filtrat Rate
mL/min          52  L



               Glucose Level                                109


               Calcium Level                                9.0


               Total Bilirubin                              0.4


               Direct Bilirubin                            0.00


               Indirect Bilirubin                           0.4


               Aspartate Amino Transf
(AST/SGOT)            25  



               Alanine Aminotransferase
(ALT/SGPT)          28  



               Alkaline Phosphatase                          79


               Total Protein                                6.1


               Albumin                                      3.6


               Globulin                                    2.50


               Albumin/Globulin Ratio                      1.44





Home Meds


Active Scripts


Benzonatate* (Tessalon Perle*) 100 Mg Capsule, 100 MG PO Q8H PRN for COUGH, #10 


CAP


   Prov:SILVIO JACOB MD         4/27/19


Prednisone* (Prednisone*) 20 Mg Tab, 40 MG PO DAILY for 4 Days, TAB


   Prov:SILVIO JACOB MD         4/27/19


Ibuprofen* (Motrin*) 600 Mg Tab, 600 MG PO Q6H PRN for PAIN AND OR ELEVATED 


TEMP, #30 TAB


   Prov:MK PEDRO MD         2/6/19


Reported Medications


Topiramate* (Topiramate*) 100 Mg Tablet, 100 MG PO BID, TAB


   2/6/19


Montelukast Sodium* (Montelukast Sodium*) 10 Mg Tablet, 10 MG PO QHS, #30 TAB


   2/6/19


Albuterol Sulfate* (Albuterol Sulfate* Neb) 0.083%-3 Ml Neb, 2.5 MG NEB Q6H PRN 


for WHEEZING AND SOB, #30 VIAL


   2/6/19


Albuterol Sulfate* (Ventolin HFA*) 18 Gm Hfa.aer.ad, 2 PUFF INHALATION Q6H, #1 


INHALER


   2/6/19


Hydrocodone/Acetaminophen (Norco  Tablet) 1 Each Tablet, 1 EACH PO Q6H, 


TAB


   2/6/19


Zolpidem Tartrate* (Zolpidem Tartrate*) 5 Mg Tablet, 5 MG PO QHS PRN for 


INSOMNIA, #30 TAB


   2/6/19


Sumatriptan Succinate* (Sumatriptan Succinate* Inj) 6 Mg/0.5 Ml Cartridge, 6 MG 


SQ Q12H PRN for MIGRAINE, EA


   MAX 6 mg/dose, repeat in 1 hour if needed. MAX 12 mg/24 hours


    AS NEEDED


   2/6/19


Fluticasone Propionate* (Fluticasone Propionate* Nasal) 50 Mcg/Spray - 16 Gm 


[place].susp, 1 SPRAY NASAL BID, #1 BOTTLE


   TO EACH NOSTRIL


   2/6/19


Medications





Current Medications


Sodium Chloride 1,000 ml @  100 mls/hr Q10H IV  Last administered on 6/18/19at 


12:19; Admin Dose 100 MLS/HR;  Start 6/17/19 at 02:28


IV Flush (NS 3 ml) 3 ml PER PROTOCOL IV ;  Start 6/17/19 at 02:30


Ondansetron HCl (Zofran Inj) 4 mg Q6H  PRN IV NAUSEA/VOMITING Last administered 


on 6/18/19at 10:55; Admin Dose 4 MG;  Start 6/17/19 at 02:30


Acetaminophen (Tylenol Tab) 650 mg Q6H  PRN PO .PAIN 1-3 OR TEMP;  Start 6/17/19


at 02:30


Docusate Sodium (Colace) 100 mg Q12H  PRN PO .CONSTIPATION;  Start 6/17/19 at 


02:30


Bisacodyl (Dulcolax) 5 mg DAILY  PRN PO .CONSTIPATION;  Start 6/17/19 at 02:30


Tamsulosin HCl (Flomax) 0.4 mg HS PO  Last administered on 6/17/19at 21:53; 


Admin Dose 0.4 MG;  Start 6/17/19 at 21:00


Sumatriptan Succinate (Imitrex) 6 mg DAILY  PRN SC HEADACHE Last administered on


6/17/19at 13:17; Admin Dose 6 MG;  Start 6/17/19 at 12:30


Hydromorphone HCl (Dilaudid) 1 mg Q4H  PRN IV SEVERE PAIN LEVEL 7-10 Last 


administered on 6/18/19at 10:56; Admin Dose 1 MG;  Start 6/18/19 at 02:00


Diphenhydramine HCl (Benadryl) 25 mg Q8  PRN IV ITCHING Last administered on 


6/18/19at 12:49; Admin Dose 25 MG;  Start 6/18/19 at 12:20





Assessment/Plan


Hospital Course (Demo Recall)


59-year-old female with known history of kidney stones presented to the 


emergency room with right flank pain.  She underwent a CT scan of the abdomen 


and pelvis and that showed a 3 mm stone at the right ureterovesical junction 


causing obstruction and hydronephrosis.  The patient has had stones in the past 


and has undergone left extracorporeal shockwave lithotripsy to a left 


ureteropelvic junction stone.  Patient does have right lower quadrant pain and 


right flank pain.  The CT scan showed:


1.  Obstructing calculus in the distal right ureter at the ureterovesicular 


junction with associated mild right hydronephrosis and hydroureter. Additional 


small calculus within the distal right ureter medially adjacent to the obstr


ucting calculus.


2.  Bilateral nonobstructing renal calculi.


3.  Diffuse left and sigmoid colon diverticulosis without diverticulitis.


4.  Otherwise no change


The stone size is 3 x 3.1 mm at the ureterovesical junction.  The patient should


be able to pass the stone.  KUB today still show the stone in the same location.


 The patient continues to have pain.  She is afebrile and her white count is no


rmal.  We will give her more chance to pass the stone.  Repeat the KUB in a.m.  


If the pain persists then we will do cystoscopy and ureteroscopy with laser 


lithotripsy and remove the stone and inserted JJ stent.











LOS BELLO MD            Jun 18, 2019 13:42

## 2019-06-19 VITALS — HEART RATE: 72 BPM | SYSTOLIC BLOOD PRESSURE: 138 MMHG | RESPIRATION RATE: 15 BRPM | DIASTOLIC BLOOD PRESSURE: 75 MMHG

## 2019-06-19 VITALS — SYSTOLIC BLOOD PRESSURE: 115 MMHG | DIASTOLIC BLOOD PRESSURE: 64 MMHG | HEART RATE: 72 BPM | RESPIRATION RATE: 14 BRPM

## 2019-06-19 VITALS — RESPIRATION RATE: 16 BRPM | DIASTOLIC BLOOD PRESSURE: 64 MMHG | SYSTOLIC BLOOD PRESSURE: 118 MMHG | HEART RATE: 62 BPM

## 2019-06-19 VITALS — RESPIRATION RATE: 16 BRPM | SYSTOLIC BLOOD PRESSURE: 117 MMHG | DIASTOLIC BLOOD PRESSURE: 60 MMHG | HEART RATE: 67 BPM

## 2019-06-19 VITALS — SYSTOLIC BLOOD PRESSURE: 145 MMHG | HEART RATE: 86 BPM | DIASTOLIC BLOOD PRESSURE: 68 MMHG | RESPIRATION RATE: 22 BRPM

## 2019-06-19 VITALS — DIASTOLIC BLOOD PRESSURE: 65 MMHG | RESPIRATION RATE: 26 BRPM | SYSTOLIC BLOOD PRESSURE: 119 MMHG | HEART RATE: 72 BPM

## 2019-06-19 VITALS — DIASTOLIC BLOOD PRESSURE: 71 MMHG | RESPIRATION RATE: 16 BRPM | SYSTOLIC BLOOD PRESSURE: 134 MMHG | HEART RATE: 68 BPM

## 2019-06-19 VITALS — RESPIRATION RATE: 29 BRPM | DIASTOLIC BLOOD PRESSURE: 70 MMHG | SYSTOLIC BLOOD PRESSURE: 123 MMHG | HEART RATE: 72 BPM

## 2019-06-19 VITALS — HEART RATE: 76 BPM | DIASTOLIC BLOOD PRESSURE: 75 MMHG | SYSTOLIC BLOOD PRESSURE: 148 MMHG | RESPIRATION RATE: 20 BRPM

## 2019-06-19 VITALS — DIASTOLIC BLOOD PRESSURE: 65 MMHG | HEART RATE: 76 BPM | SYSTOLIC BLOOD PRESSURE: 134 MMHG | RESPIRATION RATE: 10 BRPM

## 2019-06-19 VITALS — HEART RATE: 70 BPM | RESPIRATION RATE: 19 BRPM | SYSTOLIC BLOOD PRESSURE: 127 MMHG | DIASTOLIC BLOOD PRESSURE: 68 MMHG

## 2019-06-19 VITALS — SYSTOLIC BLOOD PRESSURE: 161 MMHG | HEART RATE: 102 BPM | RESPIRATION RATE: 21 BRPM | DIASTOLIC BLOOD PRESSURE: 83 MMHG

## 2019-06-19 VITALS — RESPIRATION RATE: 16 BRPM | SYSTOLIC BLOOD PRESSURE: 128 MMHG | DIASTOLIC BLOOD PRESSURE: 69 MMHG | HEART RATE: 73 BPM

## 2019-06-19 VITALS — DIASTOLIC BLOOD PRESSURE: 81 MMHG | SYSTOLIC BLOOD PRESSURE: 122 MMHG

## 2019-06-19 VITALS — HEART RATE: 78 BPM | RESPIRATION RATE: 17 BRPM | SYSTOLIC BLOOD PRESSURE: 148 MMHG | DIASTOLIC BLOOD PRESSURE: 72 MMHG

## 2019-06-19 VITALS — SYSTOLIC BLOOD PRESSURE: 132 MMHG | DIASTOLIC BLOOD PRESSURE: 70 MMHG | RESPIRATION RATE: 19 BRPM | HEART RATE: 77 BPM

## 2019-06-19 VITALS — RESPIRATION RATE: 16 BRPM | HEART RATE: 74 BPM | SYSTOLIC BLOOD PRESSURE: 136 MMHG | DIASTOLIC BLOOD PRESSURE: 71 MMHG

## 2019-06-19 VITALS — SYSTOLIC BLOOD PRESSURE: 139 MMHG | RESPIRATION RATE: 18 BRPM | DIASTOLIC BLOOD PRESSURE: 70 MMHG | HEART RATE: 69 BPM

## 2019-06-19 VITALS — RESPIRATION RATE: 20 BRPM | DIASTOLIC BLOOD PRESSURE: 80 MMHG | HEART RATE: 77 BPM | SYSTOLIC BLOOD PRESSURE: 138 MMHG

## 2019-06-19 VITALS — HEART RATE: 68 BPM | SYSTOLIC BLOOD PRESSURE: 123 MMHG | DIASTOLIC BLOOD PRESSURE: 66 MMHG | RESPIRATION RATE: 13 BRPM

## 2019-06-19 VITALS — HEART RATE: 71 BPM | SYSTOLIC BLOOD PRESSURE: 134 MMHG | DIASTOLIC BLOOD PRESSURE: 63 MMHG | RESPIRATION RATE: 16 BRPM

## 2019-06-19 VITALS — DIASTOLIC BLOOD PRESSURE: 65 MMHG | SYSTOLIC BLOOD PRESSURE: 153 MMHG | HEART RATE: 76 BPM | RESPIRATION RATE: 13 BRPM

## 2019-06-19 VITALS — DIASTOLIC BLOOD PRESSURE: 69 MMHG | HEART RATE: 82 BPM | RESPIRATION RATE: 18 BRPM | SYSTOLIC BLOOD PRESSURE: 150 MMHG

## 2019-06-19 VITALS — SYSTOLIC BLOOD PRESSURE: 153 MMHG | RESPIRATION RATE: 19 BRPM | DIASTOLIC BLOOD PRESSURE: 65 MMHG | HEART RATE: 92 BPM

## 2019-06-19 VITALS — HEART RATE: 62 BPM | SYSTOLIC BLOOD PRESSURE: 118 MMHG | RESPIRATION RATE: 16 BRPM | DIASTOLIC BLOOD PRESSURE: 64 MMHG

## 2019-06-19 LAB
ANION GAP: 9 (ref 5–13)
BLOOD UREA NITROGEN: 19 MG/DL (ref 7–20)
CALCIUM: 8.5 MG/DL (ref 8.4–10.2)
CARBON DIOXIDE: 22 MMOL/L (ref 21–31)
CHLORIDE: 114 MMOL/L (ref 97–110)
CREATININE: 0.94 MG/DL (ref 0.44–1)
GLUCOSE: 103 MG/DL (ref 70–220)
POTASSIUM: 3.9 MMOL/L (ref 3.5–5.1)
SODIUM: 145 MMOL/L (ref 135–144)

## 2019-06-19 PROCEDURE — 0TC68ZZ EXTIRPATION OF MATTER FROM RIGHT URETER, VIA NATURAL OR ARTIFICIAL OPENING ENDOSCOPIC: ICD-10-PCS | Performed by: UROLOGY

## 2019-06-19 PROCEDURE — 0T768DZ DILATION OF RIGHT URETER WITH INTRALUMINAL DEVICE, VIA NATURAL OR ARTIFICIAL OPENING ENDOSCOPIC: ICD-10-PCS | Performed by: UROLOGY

## 2019-06-19 PROCEDURE — 0TC68ZZ EXTIRPATION OF MATTER FROM RIGHT URETER, VIA NATURAL OR ARTIFICIAL OPENING ENDOSCOPIC: ICD-10-PCS

## 2019-06-19 PROCEDURE — 0T768DZ DILATION OF RIGHT URETER WITH INTRALUMINAL DEVICE, VIA NATURAL OR ARTIFICIAL OPENING ENDOSCOPIC: ICD-10-PCS

## 2019-06-19 RX ADMIN — FOLIC ACID SCH MLS/HR: 5 INJECTION, SOLUTION INTRAMUSCULAR; INTRAVENOUS; SUBCUTANEOUS at 01:50

## 2019-06-19 RX ADMIN — HYDROMORPHONE HYDROCHLORIDE 1 MG: 1 INJECTION, SOLUTION INTRAMUSCULAR; INTRAVENOUS; SUBCUTANEOUS at 13:40

## 2019-06-19 RX ADMIN — HYDROMORPHONE HYDROCHLORIDE PRN MG: 1 INJECTION, SOLUTION INTRAMUSCULAR; INTRAVENOUS; SUBCUTANEOUS at 13:40

## 2019-06-19 RX ADMIN — HYDROMORPHONE HYDROCHLORIDE 1 MG: 2 INJECTION INTRAMUSCULAR; INTRAVENOUS; SUBCUTANEOUS at 21:32

## 2019-06-19 RX ADMIN — THIAMINE HYDROCHLORIDE 1 MLS/HR: 100 INJECTION, SOLUTION INTRAMUSCULAR; INTRAVENOUS at 01:50

## 2019-06-19 RX ADMIN — CALCIUM GLUCONATE SCH MLS/HR: 94 INJECTION, SOLUTION INTRAVENOUS at 16:51

## 2019-06-19 RX ADMIN — POTASSIUM ACETATE 1 MLS/HR: 3.93 INJECTION, SOLUTION, CONCENTRATE INTRAVENOUS at 16:51

## 2019-06-19 RX ADMIN — THIAMINE HYDROCHLORIDE 1 MLS/HR: 100 INJECTION, SOLUTION INTRAMUSCULAR; INTRAVENOUS at 14:51

## 2019-06-19 RX ADMIN — MEPERIDINE HYDROCHLORIDE 1 MG: 25 INJECTION, SOLUTION INTRAMUSCULAR; INTRAVENOUS; SUBCUTANEOUS at 21:05

## 2019-06-19 RX ADMIN — DIPHENHYDRAMINE HYDROCHLORIDE PRN MG: 50 INJECTION, SOLUTION INTRAMUSCULAR; INTRAVENOUS at 01:57

## 2019-06-19 RX ADMIN — DIPHENHYDRAMINE HYDROCHLORIDE 1 MG: 50 INJECTION, SOLUTION INTRAMUSCULAR; INTRAVENOUS at 01:57

## 2019-06-19 RX ADMIN — FOLIC ACID SCH MLS/HR: 5 INJECTION, SOLUTION INTRAMUSCULAR; INTRAVENOUS; SUBCUTANEOUS at 14:51

## 2019-06-19 NOTE — PN
Date/Time of Note


Date/Time of Note


DATE: 6/19/19 


TIME: 15:07





Assessment/Plan


VTE Prophylaxis


Risk score (from Nsg)>0 risk:  1


Pharmacological prophylaxis:  NA/contraindicated


Pharm contraindication:  low risk/ambulating





Lines/Catheters


IV Catheter Type (from Nrsg):  Peripheral IV


Urinary Cath still in place:  No





Assessment/Plan


Hospital Course


1.  Right pyelonephritis with hydrate ureteronephrosis: Secondary to obstructing


calculus


Continue IV antibiotics and fluids


Follow-up on cultures, currently negative





2.  Nephrolithiasis


Patient with obstructing calculus in the right distal ureter at the UVJ junction


with mild right hydronephrosis and hydroureter


Urology consultation appreciated, recommendation is to continue IV fluids but 


may need stent placement


KUB continues to show stone at the UV junction


Continue Flomax


Pain control


Patient with a history of lithotripsy and stent placement with Dr. Olivares





3.  Headache


Continue home Imitrex





Prophylaxis: SCDs


Result Diagram:  


6/18/19 0457                                                                    


           6/19/19 0439





Results 24hrs





Laboratory Tests


               Test
                               6/19/19
04:39


               Sodium Level                               145  H


               Potassium Level                             3.9


               Chloride Level                             114  H


               Carbon Dioxide Level                         22


               Anion Gap                                     9


               Blood Urea Nitrogen                          19


               Creatinine                                 0.94


               Est Glomerular Filtrat Rate
mL/min  > 60  



               Glucose Level                               103


               Calcium Level                               8.5








Subjective


24 Hr Interval Summary


Constitutional:  no complaints





Exam/Review of Systems


Exam


Vitals





Vital Signs


  Date      Temp  Pulse  Resp  B/P (MAP)   Pulse Ox  O2          O2 Flow    FiO2


Time                                                 Delivery    Rate


   6/19/19  98.0     69    18      139/70        97  Room Air


     15:04                           (93)








Intake and Output





6/18/19 6/18/19 6/19/19





1515:00


23:00


07:00





IntakeIntake Total


1000 ml


1340 ml


920 ml





OutputOutput Total


700 ml


600 ml





BalanceBalance


1000 ml


640 ml


320 ml











Constitutional:  alert, oriented


Respiratory:  clear to auscultation


Cardiovascular:  regular rate and rhythm


Gastrointestinal:  soft; 


   No distended


Musculoskeletal:  nl extremities to inspection





Results


Results 24hrs





Laboratory Tests


               Test
                               6/19/19
04:39


               Sodium Level                               145  H


               Potassium Level                             3.9


               Chloride Level                             114  H


               Carbon Dioxide Level                         22


               Anion Gap                                     9


               Blood Urea Nitrogen                          19


               Creatinine                                 0.94


               Est Glomerular Filtrat Rate
mL/min  > 60  



               Glucose Level                               103


               Calcium Level                               8.5








Medications


Medication





Current Medications


Sodium Chloride 1,000 ml @  100 mls/hr Q10H IV  Last administered on 6/19/19at 


14:51; Admin Dose 100 MLS/HR;  Start 6/17/19 at 02:28


IV Flush (NS 3 ml) 3 ml PER PROTOCOL IV ;  Start 6/17/19 at 02:30


Ondansetron HCl (Zofran Inj) 4 mg Q6H  PRN IV NAUSEA/VOMITING Last administered 


on 6/18/19at 10:55; Admin Dose 4 MG;  Start 6/17/19 at 02:30


Acetaminophen (Tylenol Tab) 650 mg Q6H  PRN PO .PAIN 1-3 OR TEMP;  Start 6/17/19


at 02:30


Docusate Sodium (Colace) 100 mg Q12H  PRN PO .CONSTIPATION;  Start 6/17/19 at 


02:30


Bisacodyl (Dulcolax) 5 mg DAILY  PRN PO .CONSTIPATION;  Start 6/17/19 at 02:30


Tamsulosin HCl (Flomax) 0.4 mg HS PO  Last administered on 6/18/19at 20:37; 


Admin Dose 0.4 MG;  Start 6/17/19 at 21:00


Sumatriptan Succinate (Imitrex) 6 mg DAILY  PRN SC HEADACHE Last administered on


6/17/19at 13:17; Admin Dose 6 MG;  Start 6/17/19 at 12:30


Hydromorphone HCl (Dilaudid) 1 mg Q4H  PRN IV SEVERE PAIN LEVEL 7-10 Last 


administered on 6/19/19at 13:40; Admin Dose 1 MG;  Start 6/18/19 at 02:00


Diphenhydramine HCl (Benadryl) 25 mg Q8  PRN IV ITCHING Last administered on 


6/19/19at 01:57; Admin Dose 25 MG;  Start 6/18/19 at 12:20











MARGOTH DANIEL                  Jun 19, 2019 15:08

## 2019-06-19 NOTE — HPN
Date/Time of Note


Date/Time of Note


DATE: 6/19/19 


TIME: 19:43





Interval H&P Admission Note


Pt. seen H&P reviewed:  No system changes











LOS BELLO MD            Jun 19, 2019 19:43

## 2019-06-19 NOTE — OPR
Date/Time of Note


Date/Time of Note


DATE: 6/19/19 


TIME: 20:52





Operative Report


Procedure Date:  Jun 19, 2019


Preoperative Diagnosis


Distal right ureteral stone


Postoperative Diagnosis


Same


Operation/Procedure Performed


Cystoscopy, right ureteroscopy, laser lithotripsy and insertion of right 


ureteral JJ stent 6 [place] by 22 cm long


Surgeon


see signature line


Assistant


Jerry Gamble


Anesthesia Type:  general


Anesthesiologist:  AYE LINDQUIST MD


Estimated Blood Loss:  none


Transfusion


   none


Specimen


Stone fragments


Grafts/Implants


Right ureteral JJ stent 6 [place] by 22 cm long


Complications


none


Pt Condition Post Procedure:  stable


Disposition:  PACU


Indications


Distal right ureteral stone with persistent pain


Procedure Description


The patient was brought to the operating room.  Time out was done the patient 


was identified by her name, birthdate, the procedure and the side of the 


procedure.  The patient was given 2 g of Ancef IV at the start of the procedure.


 The patient was then positioned in the lithotomy position and the genital area 


was prepped and draped in the usual sterile manner.  #21 [place] cystoscope 


sheath was introduced into the bladder, urine was collected for culture and 


sensitivity.  The right ureteral orifice was then cannulated with a 5 [place] 


open ended ureteral catheter and a 0.035 zip wire was passed through it into the


right ureter.  It was hitting the stone and not passing it but after some ma


nipulation I was able to advance it above the stone all the way up to the 


kidney.  Then I reintroduced the open ended ureteral catheter into the ureter 


and passed a 0.035 sensor wire all the way up to the kidney.  The sensor wire 


was used as a safety wire and the zip wire was used to advance the rigid 


ureteroscope on it.  The stone was visualized, then the wire was removed and the


265 m laser fiber was used and the holmium laser was used to break the stone 


into multiple pieces. these pieces were then basketed one at a time and dropped 


into the bladder the ureter was cleared completely from the stone fragments.  


Then I did the cystoscopy and  drained all the stone fragments out of the 


bladder.  Then I reintroduced the cystoscope on the safety wire and inserted a 6


[place] by 22 cm long JJ stent in the right ureter under fluoroscopy, had its 


proximal end curling into the kidney and the distal end curling into the 


bladder.  The distal end is attached to a string that was brought out through 


the urethra and taped with 2 pieces of Tegaderm to her right groin.  The patient


tolerated the procedure well and was transferred to recovery room in stable and 


satisfactory condition











LOS BELLO MD            Jun 19, 2019 20:58

## 2019-06-19 NOTE — PREAC
Date/Time of Note


Date/Time of Note


DATE: 6/19/19 


TIME: 19:52





Anesthesia Eval and Record


Evaluation


Time Pre-Procedure Interview


DATE: 6/19/19 


TIME: 19:52


Age


59


Sex


female


NPO:  8 hrs


Preoperative diagnosis


right renal stone


Planned procedure


cystoscopy JJ stent placement, uretrocopy  laser lithotripsy





Past Medical History


Past Medical History:  Includes


Pulm:  Asthma





Surgery & Anesthesia Issues


No known issue





Meds


Anticoagulation:  No


Beta Blocker within 24 hr:  No


Reason Beta Blocker not given:  Pt. not on B-Blocker


Active Scripts


Benzonatate* (Tessalon Perle*) 100 Mg Capsule, 100 MG PO Q8H PRN for COUGH, #10 


CAP


   Prov:SILVIO JACOB MD         4/27/19


Prednisone* (Prednisone*) 20 Mg Tab, 40 MG PO DAILY for 4 Days, TAB


   Prov:SILVIO JACOB MD         4/27/19


Ibuprofen* (Motrin*) 600 Mg Tab, 600 MG PO Q6H PRN for PAIN AND OR ELEVATED 


TEMP, #30 TAB


   Prov:MK PEDRO MD         2/6/19


Reported Medications


Topiramate* (Topiramate*) 100 Mg Tablet, 100 MG PO BID, TAB


   2/6/19


Montelukast Sodium* (Montelukast Sodium*) 10 Mg Tablet, 10 MG PO QHS, #30 TAB


   2/6/19


Albuterol Sulfate* (Albuterol Sulfate* Neb) 0.083%-3 Ml Neb, 2.5 MG NEB Q6H PRN 


for WHEEZING AND SOB, #30 VIAL


   2/6/19


Albuterol Sulfate* (Ventolin HFA*) 18 Gm Hfa.aer.ad, 2 PUFF INHALATION Q6H, #1 


INHALER


   2/6/19


Hydrocodone/Acetaminophen (Norco  Tablet) 1 Each Tablet, 1 EACH PO Q6H, 


TAB


   2/6/19


Zolpidem Tartrate* (Zolpidem Tartrate*) 5 Mg Tablet, 5 MG PO QHS PRN for 


INSOMNIA, #30 TAB


   2/6/19


Sumatriptan Succinate* (Sumatriptan Succinate* Inj) 6 Mg/0.5 Ml Cartridge, 6 MG 


SQ Q12H PRN for MIGRAINE, EA


   MAX 6 mg/dose, repeat in 1 hour if needed. MAX 12 mg/24 hours


    AS NEEDED


   2/6/19


Fluticasone Propionate* (Fluticasone Propionate* Nasal) 50 Mcg/Spray - 16 Gm 


[place].susp, 1 SPRAY NASAL BID, #1 BOTTLE


   TO EACH NOSTRIL


   2/6/19





Current Medications


IV Flush (NS 3 ml) 3 ml PER PROTOCOL IV ;  Start 6/17/19 at 02:30


Ondansetron HCl (Zofran Inj) 4 mg Q6H  PRN IV NAUSEA/VOMITING Last administered 


on 6/18/19at 10:55; Admin Dose 4 MG;  Start 6/17/19 at 02:30


Acetaminophen (Tylenol Tab) 650 mg Q6H  PRN PO .PAIN 1-3 OR TEMP;  Start 6/17/19


at 02:30


Docusate Sodium (Colace) 100 mg Q12H  PRN PO .CONSTIPATION;  Start 6/17/19 at 


02:30


Bisacodyl (Dulcolax) 5 mg DAILY  PRN PO .CONSTIPATION;  Start 6/17/19 at 02:30


Tamsulosin HCl (Flomax) 0.4 mg HS PO  Last administered on 6/18/19at 20:37; 


Admin Dose 0.4 MG;  Start 6/17/19 at 21:00


Sumatriptan Succinate (Imitrex) 6 mg DAILY  PRN SC HEADACHE Last administered on


6/17/19at 13:17; Admin Dose 6 MG;  Start 6/17/19 at 12:30


Hydromorphone HCl (Dilaudid) 1 mg Q4H  PRN IV SEVERE PAIN LEVEL 7-10 Last 


administered on 6/19/19at 13:40; Admin Dose 1 MG;  Start 6/18/19 at 02:00


Diphenhydramine HCl (Benadryl) 25 mg Q8  PRN IV ITCHING Last administered on 


6/19/19at 01:57; Admin Dose 25 MG;  Start 6/18/19 at 12:20


Sodium Chloride 1,000 ml @  100 mls/hr Q10H IV  Last administered on 6/19/19at 


16:51; Admin Dose 100 MLS/HR;  Start 6/19/19 at 15:30


Meds reviewed:  Yes





Allergies


Coded Allergies:  


     latex (Unverified  Allergy, Unknown, 4/27/19)


     levofloxacin (Unverified  Allergy, Unknown, 4/27/19)


Allergies Reviewed:  Yes





Labs/Studies


Labs Reviewed:  Reviewed by anesthesiologist


Result Diagram:  


6/18/19 0457                                                                    


           6/19/19 0439





Laboratory Tests


6/19/19 04:39








Pregnancy test:  N/A


Studies:  ECG (sr), CXR (nl)





Pre-procedure Exam


Last vitals





Vital Signs


  Date      Temp  Pulse  Resp  B/P (MAP)   Pulse Ox  O2          O2 Flow    FiO2


Time                                                 Delivery    Rate


   6/19/19  98.0     69    18      139/70        97  Room Air


     15:04                           (93)





Airway:  Adequate thyromental dist


Mallampati:  Mallampati I


Teeth:  Normal


Lung:  Normal


Heart:  Normal





ASA Physical Status


ASA physical status:  2


Emergency:  None





Planned Anesthetic


General/MAC:  LMA





Planned Pain Management


Parenteral pain med





Pre-operative Attestations


Prior to commencing anesthesia and surgery, the patient was re-evaluated, there 


was verification of:


*The patient's identity


*The results of appropriate recent lab work and preoperative vital signs


*The above evaluation not changing prior to induction


*Anesthetic plan, risk benefits, alternative and complications discussed with 


patient/family; questions answered; patient/family understands, accepts and 


wishes to proceed.











AYE LINDQUIST MD            Jun 19, 2019 19:54

## 2019-06-20 VITALS — SYSTOLIC BLOOD PRESSURE: 141 MMHG | HEART RATE: 74 BPM | DIASTOLIC BLOOD PRESSURE: 80 MMHG | RESPIRATION RATE: 20 BRPM

## 2019-06-20 VITALS — DIASTOLIC BLOOD PRESSURE: 74 MMHG | RESPIRATION RATE: 18 BRPM | HEART RATE: 63 BPM | SYSTOLIC BLOOD PRESSURE: 122 MMHG

## 2019-06-20 LAB
ANION GAP: 7 (ref 5–13)
BLOOD UREA NITROGEN: 14 MG/DL (ref 7–20)
CALCIUM: 8.8 MG/DL (ref 8.4–10.2)
CARBON DIOXIDE: 26 MMOL/L (ref 21–31)
CHLORIDE: 110 MMOL/L (ref 97–110)
CREATININE: 0.77 MG/DL (ref 0.44–1)
GLUCOSE: 97 MG/DL (ref 70–220)
POTASSIUM: 3.5 MMOL/L (ref 3.5–5.1)
SODIUM: 143 MMOL/L (ref 135–144)

## 2019-06-20 RX ADMIN — SUMATRIPTAN SUCCINATE 1 MG: 6 INJECTION SUBCUTANEOUS at 02:34

## 2019-06-20 RX ADMIN — SUMATRIPTAN SUCCINATE PRN MG: 6 INJECTION SUBCUTANEOUS at 02:34

## 2019-06-20 RX ADMIN — CALCIUM GLUCONATE SCH MLS/HR: 94 INJECTION, SOLUTION INTRAVENOUS at 00:27

## 2019-06-20 RX ADMIN — POTASSIUM ACETATE 1 MLS/HR: 3.93 INJECTION, SOLUTION, CONCENTRATE INTRAVENOUS at 00:27

## 2019-06-20 RX ADMIN — HYDROMORPHONE HYDROCHLORIDE 1 MG: 1 INJECTION, SOLUTION INTRAMUSCULAR; INTRAVENOUS; SUBCUTANEOUS at 00:30

## 2019-06-20 RX ADMIN — HYDROMORPHONE HYDROCHLORIDE PRN MG: 1 INJECTION, SOLUTION INTRAMUSCULAR; INTRAVENOUS; SUBCUTANEOUS at 00:30

## 2019-06-20 NOTE — DS
Date/Time of Note


Date/Time of Note


DATE: 6/20/19 


TIME: 14:56





Discharge Summary


Admission/Discharge Info


Admit Date/Time


Jun 17, 2019 at 01:32


Discharge Date/Time


Jun 20, 2019 at 11:06


Discharge Diagnosis


1.  Right pyelonephritis hydronephrosis secondary to obstructing calculus


Status post IV antibiotics and fluids


Cultures are negative





2.  Nephrolithiasis


Patient with obstructing calculus in the right distal ureter at the UVJ junction


with mild right hydronephrosis and hydroureter


Urology consultation appreciated, patient is status post cystoscopy, right 


ureteroscopy, laser lithotripsy and insertion of right ureteral JJ stent 


As post Flomax


Pain control


Patient with a history of lithotripsy and stent placement with Dr. Bello





3.  Headache


Continue home Imitrex


Patient Condition:  Good


Hospital Course


Patient is a 59-year-old female with a history of headaches and nephrolithiasis 


status post lithotripsy and stent placement in the past.  Patient presents with 


nephrolithiasis and infected ureteral stone, patient received IV fluids and 


antibiotics with no resolution of stone at the UVJ junction.  Patient was seen 


by urology and underwent Cystoscopy, right ureteroscopy, laser lithotripsy and 


insertion of right ureteral JJ stent.  Patient was cleared for DC per urology, 


on the day of discharge patient vitals, labs and physical exam are stable.


Home Meds


Active Scripts


Benzonatate* (Tessalon Perle*) 100 Mg Capsule, 100 MG PO Q8H PRN for COUGH, #10 


CAP


   Prov:SILVIO JACOB MD         4/27/19


Prednisone* (Prednisone*) 20 Mg Tab, 40 MG PO DAILY for 4 Days, TAB


   Prov:SILVIO JACOB MD         4/27/19


Ibuprofen* (Motrin*) 600 Mg Tab, 600 MG PO Q6H PRN for PAIN AND OR ELEVATED 


TEMP, #30 TAB


   Prov:MK PEDRO MD         2/6/19


Reported Medications


Topiramate* (Topiramate*) 100 Mg Tablet, 100 MG PO BID, TAB


   2/6/19


Montelukast Sodium* (Montelukast Sodium*) 10 Mg Tablet, 10 MG PO QHS, #30 TAB


   2/6/19


Albuterol Sulfate* (Albuterol Sulfate* Neb) 0.083%-3 Ml Neb, 2.5 MG NEB Q6H PRN 


for WHEEZING AND SOB, #30 VIAL


   2/6/19


Albuterol Sulfate* (Ventolin HFA*) 18 Gm Hfa.aer.ad, 2 PUFF INHALATION Q6H, #1 


INHALER


   2/6/19


Hydrocodone/Acetaminophen (Norco  Tablet) 1 Each Tablet, 1 EACH PO Q6H, 


TAB


   2/6/19


Zolpidem Tartrate* (Zolpidem Tartrate*) 5 Mg Tablet, 5 MG PO QHS PRN for 


INSOMNIA, #30 TAB


   2/6/19


Sumatriptan Succinate* (Sumatriptan Succinate* Inj) 6 Mg/0.5 Ml Cartridge, 6 MG 


SQ Q12H PRN for MIGRAINE, EA


   MAX 6 mg/dose, repeat in 1 hour if needed. MAX 12 mg/24 hours


    AS NEEDED


   2/6/19


Fluticasone Propionate* (Fluticasone Propionate* Nasal) 50 Mcg/Spray - 16 Gm 


[place].susp, 1 SPRAY NASAL BID, #1 BOTTLE


   TO EACH NOSTRIL


   2/6/19


Follow-up Plan


FOLLOW UP WITH YOUR PCP IN 1-2 WEEKS, FOLLOW UP WITH DR BELLO


Primary Care Provider


[facility]


Time spent on discharge:   > 30 minutes











MARGOTH DANIEL                  Jun 20, 2019 14:59

## 2019-06-20 NOTE — PAC
Date/Time of Note


Date/Time of Note


DATE: 6/20/19 


TIME: 06:52





Post-Anesthesia Notes


Post-Anesthesia Note


Last documented vital signs





Vital Signs


  Date      Temp  Pulse  Resp  B/P (MAP)   Pulse Ox  O2          O2 Flow    FiO2


Time                                                 Delivery    Rate


   6/20/19  98.6     63    18      122/74        93


     02:20                           (90)


   6/19/19                                           Room Air


     23:45


   6/19/19                                                             8.0


     20:56





Activity:  WNL


Respiratory function:  WNL


Cardiovascular function:  WNL


Mental status:  Baseline


Pain reasonably controlled:  Yes


Hydration appropriate:  Yes


Nausea/Vomiting absent:  No











AYE LINDQUIST MD            Jun 20, 2019 06:52

## 2019-06-20 NOTE — PDOCDIS
Discharge Instructions


CONDITION


                 [id number]
Patient Condition:  [id number]
Good








HOME CARE INSTRUCTIONS:


                [id number]
Diet Instructions:  [id number]
Regular








ACTIVITY:


       [id number]
Activity Restrictions:  [id number]
Rest between Activity


                                          Avoid heavy lifting


                                          Avoid Heavy Housework








FOLLOW UP/APPOINTMENTS


Follow-up Plan


FOLLOW UP WITH YOUR PCP IN 1-2 WEEKS, FOLLOW UP WITH MARGOTH RAIN                  Jun 20, 2019 10:11

## 2019-06-21 NOTE — RADRPT
Vent Rate: 74 bpm

RR Interval: 812 msec

WI Interval: 159 msec

QRS Duration: 83 msec

QT Interval: 375 msec

QTC Interval: 416 msec

P-R-T Axis: 52 - 19 - 10 degrees

 

Sinus rhythm...normal P axis, V-rate  50- 99

Low voltage, precordial leads...precordial leads <1.0mV

 

Electronically Signed By: Rony Barone

## 2023-09-10 ENCOUNTER — HOSPITAL ENCOUNTER (EMERGENCY)
Dept: HOSPITAL 12 - ER | Age: 63
Discharge: HOME | End: 2023-09-10
Payer: COMMERCIAL

## 2023-09-10 VITALS — OXYGEN SATURATION: 98 %

## 2023-09-10 VITALS — WEIGHT: 154 LBS | HEIGHT: 66 IN | BODY MASS INDEX: 24.75 KG/M2

## 2023-09-10 DIAGNOSIS — Z91.040: ICD-10-CM

## 2023-09-10 DIAGNOSIS — J45.909: ICD-10-CM

## 2023-09-10 DIAGNOSIS — G43.909: ICD-10-CM

## 2023-09-10 DIAGNOSIS — N39.0: Primary | ICD-10-CM

## 2023-09-10 DIAGNOSIS — Z88.1: ICD-10-CM

## 2023-09-10 DIAGNOSIS — R11.0: ICD-10-CM

## 2023-09-10 DIAGNOSIS — Z88.8: ICD-10-CM

## 2023-09-10 DIAGNOSIS — Z79.899: ICD-10-CM

## 2023-09-10 DIAGNOSIS — M54.9: ICD-10-CM

## 2023-09-10 LAB
ALBUMIN SERPL BCG-MCNC: 4 G/DL (ref 3.4–5)
ALP SERPL-CCNC: 113 U/L (ref 50–136)
ALT SERPL W/O P-5'-P-CCNC: 21 U/L (ref 14–59)
APPEARANCE UR: CLEAR
AST SERPL-CCNC: 12 U/L (ref 15–37)
BASOPHILS # BLD AUTO: 0.1 K/UL (ref 0–0.2)
BASOPHILS NFR BLD AUTO: 1.3 % (ref 0–2)
BILIRUB DIRECT SERPL-MCNC: 0.1 MG/DL (ref 0–0.2)
BILIRUB SERPL-MCNC: 0.4 MG/DL (ref 0.2–1)
BILIRUB UR QL STRIP: NEGATIVE
BUN SERPL-MCNC: 19 MG/DL (ref 7–18)
CALCIUM SERPL-MCNC: 9.4 MG/DL (ref 8.5–10.1)
CHLORIDE SERPL-SCNC: 104 MMOL/L (ref 98–107)
CO2 SERPL-SCNC: 29 MMOL/L (ref 21–32)
COLOR UR: YELLOW
CREAT SERPL-MCNC: 0.9 MG/DL (ref 0.6–1.3)
EOSINOPHIL # BLD AUTO: 0.2 K/UL (ref 0–0.7)
EOSINOPHIL NFR BLD AUTO: 4.8 % (ref 0–7)
ERYTHROCYTE [DISTWIDTH] IN BLOOD BY AUTOMATED COUNT: 13 % (ref 12.3–17.7)
GLUCOSE SERPL-MCNC: 114 MG/DL (ref 74–106)
GLUCOSE UR STRIP-MCNC: NEGATIVE MG/DL
HCT VFR BLD AUTO: 42.9 % (ref 31.2–41.9)
HGB BLD-MCNC: 14.5 G/DL (ref 10.9–14.3)
HGB UR QL STRIP: NEGATIVE
KETONES UR STRIP-MCNC: NEGATIVE MG/DL
LEUKOCYTE ESTERASE UR QL STRIP: NEGATIVE
LYMPHOCYTES # BLD AUTO: 1.7 K/UL (ref 0.8–4.8)
LYMPHOCYTES NFR BLD AUTO: 35.3 % (ref 20.5–51.5)
MANUAL DIF COMMENT BLD-IMP: 1
MCH RBC QN AUTO: 31 UUG (ref 24.7–32.8)
MCHC RBC AUTO-ENTMCNC: 34 G/DL (ref 32.3–35.6)
MCV RBC AUTO: 91.4 FL (ref 75.5–95.3)
MONOCYTES # BLD AUTO: 0.4 K/UL (ref 0.1–1.3)
MONOCYTES NFR BLD AUTO: 8.9 % (ref 0–11)
NEUTROPHILS # BLD AUTO: 2.4 K/UL (ref 1.8–8.9)
NEUTROPHILS NFR BLD AUTO: 49.7 % (ref 38.5–71.5)
NITRITE UR QL STRIP: NEGATIVE
PH UR STRIP: 7.5 [PH] (ref 5–8)
PLATELET # BLD AUTO: 220 K/UL (ref 179–408)
POTASSIUM SERPL-SCNC: 3.6 MMOL/L (ref 3.5–5.1)
PROT UR QL STRIP: NEGATIVE
RBC # BLD AUTO: 4.69 MIL/UL (ref 3.63–4.92)
SODIUM SERPL-SCNC: 143 MMOL/L (ref 136–145)
SP GR UR STRIP: 1.02 (ref 1–1.03)
UROBILINOGEN UR STRIP-MCNC: 1 E.U./DL
WBC # BLD AUTO: 4.8 K/UL (ref 3.8–11.8)
WS STN SPEC: 7 G/DL (ref 6.4–8.2)

## 2023-09-10 PROCEDURE — 85730 THROMBOPLASTIN TIME PARTIAL: CPT

## 2023-09-10 PROCEDURE — 84145 PROCALCITONIN (PCT): CPT

## 2023-09-10 PROCEDURE — 96374 THER/PROPH/DIAG INJ IV PUSH: CPT

## 2023-09-10 PROCEDURE — 80076 HEPATIC FUNCTION PANEL: CPT

## 2023-09-10 PROCEDURE — A4663 DIALYSIS BLOOD PRESSURE CUFF: HCPCS

## 2023-09-10 PROCEDURE — 99283 EMERGENCY DEPT VISIT LOW MDM: CPT

## 2023-09-10 PROCEDURE — 85025 COMPLETE CBC W/AUTO DIFF WBC: CPT

## 2023-09-10 PROCEDURE — 80048 BASIC METABOLIC PNL TOTAL CA: CPT

## 2023-09-10 PROCEDURE — 36415 COLL VENOUS BLD VENIPUNCTURE: CPT

## 2023-09-10 PROCEDURE — 83605 ASSAY OF LACTIC ACID: CPT

## 2023-09-10 PROCEDURE — 87040 BLOOD CULTURE FOR BACTERIA: CPT

## 2023-09-10 PROCEDURE — 83690 ASSAY OF LIPASE: CPT

## 2023-09-10 PROCEDURE — 81003 URINALYSIS AUTO W/O SCOPE: CPT
